# Patient Record
Sex: FEMALE | Race: BLACK OR AFRICAN AMERICAN | NOT HISPANIC OR LATINO | URBAN - METROPOLITAN AREA
[De-identification: names, ages, dates, MRNs, and addresses within clinical notes are randomized per-mention and may not be internally consistent; named-entity substitution may affect disease eponyms.]

---

## 2018-08-15 ENCOUNTER — EMERGENCY (EMERGENCY)
Facility: HOSPITAL | Age: 38
LOS: 0 days | Discharge: HOME | End: 2018-08-15
Attending: EMERGENCY MEDICINE
Payer: COMMERCIAL

## 2018-08-15 VITALS
TEMPERATURE: 97 F | RESPIRATION RATE: 18 BRPM | WEIGHT: 190.04 LBS | DIASTOLIC BLOOD PRESSURE: 89 MMHG | HEART RATE: 71 BPM | SYSTOLIC BLOOD PRESSURE: 143 MMHG | OXYGEN SATURATION: 100 % | HEIGHT: 66 IN
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
## 2018-08-15 DIAGNOSIS — M79.1 MYALGIA: ICD-10-CM
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
## 2018-08-15 DIAGNOSIS — M54.2 CERVICALGIA: ICD-10-CM

## 2018-08-15 DIAGNOSIS — J45.909 UNSPECIFIED ASTHMA, UNCOMPLICATED: ICD-10-CM

## 2018-08-15 DIAGNOSIS — S43.102A UNSPECIFIED DISLOCATION OF LEFT ACROMIOCLAVICULAR JOINT, INITIAL ENCOUNTER: ICD-10-CM

## 2018-08-15 PROCEDURE — 71046 X-RAY EXAM CHEST 2 VIEWS: CPT | Mod: 26

## 2018-08-15 PROCEDURE — 73030 X-RAY EXAM OF SHOULDER: CPT | Mod: 26,LT

## 2018-08-15 PROCEDURE — 99283 EMERGENCY DEPT VISIT LOW MDM: CPT

## 2018-08-15 PROCEDURE — 73070 X-RAY EXAM OF ELBOW: CPT | Mod: 26,LT

## 2018-08-15 RX ORDER — IBUPROFEN 200 MG
600 TABLET ORAL ONCE
Qty: 0 | Refills: 0 | Status: COMPLETED | OUTPATIENT
Start: 2018-08-15 | End: 2018-08-15

## 2018-08-15 RX ORDER — CYCLOBENZAPRINE HYDROCHLORIDE 10 MG/1
10 TABLET, FILM COATED ORAL ONCE
Qty: 0 | Refills: 0 | Status: COMPLETED | OUTPATIENT
Start: 2018-08-15 | End: 2018-08-15

## 2018-08-15 RX ORDER — IBUPROFEN 200 MG
1 TABLET ORAL
Qty: 28 | Refills: 0
Start: 2018-08-15 | End: 2018-08-21

## 2018-08-15 RX ORDER — CYCLOBENZAPRINE HYDROCHLORIDE 10 MG/1
1 TABLET, FILM COATED ORAL
Qty: 15 | Refills: 0
Start: 2018-08-15 | End: 2018-08-19

## 2018-08-15 RX ADMIN — CYCLOBENZAPRINE HYDROCHLORIDE 10 MILLIGRAM(S): 10 TABLET, FILM COATED ORAL at 15:19

## 2018-08-15 RX ADMIN — Medication 600 MILLIGRAM(S): at 15:20
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
## 2018-08-15 NOTE — ED ADULT NURSE NOTE - NSIMPLEMENTINTERV_GEN_ALL_ED
Implemented All Fall with Harm Risk Interventions:  [place] to call system. Call bell, personal items and telephone within reach. Instruct patient to call for assistance. Room bathroom lighting operational. Non-slip footwear when patient is off stretcher. Physically safe environment: no spills, clutter or unnecessary equipment. Stretcher in lowest position, wheels locked, appropriate side rails in place. Provide visual cue, wrist band, yellow gown, etc. Monitor gait and stability. Monitor for mental status changes and reorient to person, place, and time. Review medications for side effects contributing to fall risk. Reinforce activity limits and safety measures with patient and family. Provide visual clues: red socks.

## 2018-08-15 NOTE — ED PROVIDER NOTE - PROGRESS NOTE DETAILS
Pt pain resolved w ibuprofen, and flexeril, pt has ac joint separation, placed in sling, and referred to ortho for f/u.
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
## 2019-06-20 ENCOUNTER — TRANSCRIPTION ENCOUNTER (OUTPATIENT)
Age: 39
End: 2019-06-20

## 2019-06-21 ENCOUNTER — INPATIENT (INPATIENT)
Facility: HOSPITAL | Age: 39
LOS: 0 days | Discharge: HOME | End: 2019-06-21
Attending: OBSTETRICS & GYNECOLOGY | Admitting: OBSTETRICS & GYNECOLOGY
Payer: COMMERCIAL

## 2019-06-21 ENCOUNTER — RESULT REVIEW (OUTPATIENT)
Age: 39
End: 2019-06-21

## 2019-06-21 VITALS
RESPIRATION RATE: 17 BRPM | TEMPERATURE: 98 F | SYSTOLIC BLOOD PRESSURE: 112 MMHG | HEART RATE: 61 BPM | OXYGEN SATURATION: 100 % | DIASTOLIC BLOOD PRESSURE: 60 MMHG

## 2019-06-21 VITALS
TEMPERATURE: 98 F | RESPIRATION RATE: 14 BRPM | DIASTOLIC BLOOD PRESSURE: 79 MMHG | SYSTOLIC BLOOD PRESSURE: 121 MMHG | HEART RATE: 75 BPM | OXYGEN SATURATION: 100 %

## 2019-06-21 DIAGNOSIS — O00.90 UNSPECIFIED ECTOPIC PREGNANCY WITHOUT INTRAUTERINE PREGNANCY: ICD-10-CM

## 2019-06-21 DIAGNOSIS — Z98.891 HISTORY OF UTERINE SCAR FROM PREVIOUS SURGERY: Chronic | ICD-10-CM

## 2019-06-21 PROBLEM — J45.909 UNSPECIFIED ASTHMA, UNCOMPLICATED: Chronic | Status: ACTIVE | Noted: 2018-08-15

## 2019-06-21 LAB
ALBUMIN SERPL ELPH-MCNC: 4.7 G/DL — SIGNIFICANT CHANGE UP (ref 3.3–5)
ALP SERPL-CCNC: 57 U/L — SIGNIFICANT CHANGE UP (ref 40–120)
ALT FLD-CCNC: 21 U/L — SIGNIFICANT CHANGE UP (ref 4–33)
ANION GAP SERPL CALC-SCNC: 11 MMO/L — SIGNIFICANT CHANGE UP (ref 7–14)
APPEARANCE UR: CLEAR — SIGNIFICANT CHANGE UP
APTT BLD: 26.4 SEC — LOW (ref 27.5–36.3)
AST SERPL-CCNC: 13 U/L — SIGNIFICANT CHANGE UP (ref 4–32)
BACTERIA # UR AUTO: NEGATIVE — SIGNIFICANT CHANGE UP
BASE EXCESS BLDV CALC-SCNC: 0.5 MMOL/L — SIGNIFICANT CHANGE UP
BASOPHILS # BLD AUTO: 0.02 K/UL — SIGNIFICANT CHANGE UP (ref 0–0.2)
BASOPHILS NFR BLD AUTO: 0.3 % — SIGNIFICANT CHANGE UP (ref 0–2)
BILIRUB SERPL-MCNC: 0.3 MG/DL — SIGNIFICANT CHANGE UP (ref 0.2–1.2)
BILIRUB UR-MCNC: NEGATIVE — SIGNIFICANT CHANGE UP
BLD GP AB SCN SERPL QL: NEGATIVE — SIGNIFICANT CHANGE UP
BLOOD GAS VENOUS - CREATININE: 0.78 MG/DL — SIGNIFICANT CHANGE UP (ref 0.5–1.3)
BLOOD UR QL VISUAL: HIGH
BUN SERPL-MCNC: 23 MG/DL — SIGNIFICANT CHANGE UP (ref 7–23)
CALCIUM SERPL-MCNC: 9.3 MG/DL — SIGNIFICANT CHANGE UP (ref 8.4–10.5)
CHLORIDE BLDV-SCNC: 105 MMOL/L — SIGNIFICANT CHANGE UP (ref 96–108)
CHLORIDE SERPL-SCNC: 101 MMOL/L — SIGNIFICANT CHANGE UP (ref 98–107)
CO2 SERPL-SCNC: 25 MMOL/L — SIGNIFICANT CHANGE UP (ref 22–31)
COLOR SPEC: YELLOW — SIGNIFICANT CHANGE UP
CREAT SERPL-MCNC: 0.79 MG/DL — SIGNIFICANT CHANGE UP (ref 0.5–1.3)
EOSINOPHIL # BLD AUTO: 0.01 K/UL — SIGNIFICANT CHANGE UP (ref 0–0.5)
EOSINOPHIL NFR BLD AUTO: 0.1 % — SIGNIFICANT CHANGE UP (ref 0–6)
GAS PNL BLDV: 136 MMOL/L — SIGNIFICANT CHANGE UP (ref 136–146)
GLUCOSE BLDV-MCNC: 102 MG/DL — HIGH (ref 70–99)
GLUCOSE SERPL-MCNC: 110 MG/DL — HIGH (ref 70–99)
GLUCOSE UR-MCNC: NEGATIVE — SIGNIFICANT CHANGE UP
HCG SERPL-ACNC: 537.8 MIU/ML — SIGNIFICANT CHANGE UP
HCO3 BLDV-SCNC: 23 MMOL/L — SIGNIFICANT CHANGE UP (ref 20–27)
HCT VFR BLD CALC: 35 % — SIGNIFICANT CHANGE UP (ref 34.5–45)
HCT VFR BLDV CALC: 35.1 % — SIGNIFICANT CHANGE UP (ref 34.5–45)
HGB BLD-MCNC: 10.9 G/DL — LOW (ref 11.5–15.5)
HGB BLDV-MCNC: 11.4 G/DL — LOW (ref 11.5–15.5)
HYALINE CASTS # UR AUTO: NEGATIVE — SIGNIFICANT CHANGE UP
IMM GRANULOCYTES NFR BLD AUTO: 0.3 % — SIGNIFICANT CHANGE UP (ref 0–1.5)
INR BLD: 1.1 — SIGNIFICANT CHANGE UP (ref 0.88–1.17)
KETONES UR-MCNC: SIGNIFICANT CHANGE UP
LACTATE BLDV-MCNC: 0.7 MMOL/L — SIGNIFICANT CHANGE UP (ref 0.5–2)
LEUKOCYTE ESTERASE UR-ACNC: NEGATIVE — SIGNIFICANT CHANGE UP
LIDOCAIN IGE QN: 19.2 U/L — SIGNIFICANT CHANGE UP (ref 7–60)
LYMPHOCYTES # BLD AUTO: 1.01 K/UL — SIGNIFICANT CHANGE UP (ref 1–3.3)
LYMPHOCYTES # BLD AUTO: 14.5 % — SIGNIFICANT CHANGE UP (ref 13–44)
MCHC RBC-ENTMCNC: 26.5 PG — LOW (ref 27–34)
MCHC RBC-ENTMCNC: 31.1 % — LOW (ref 32–36)
MCV RBC AUTO: 85.2 FL — SIGNIFICANT CHANGE UP (ref 80–100)
MONOCYTES # BLD AUTO: 0.29 K/UL — SIGNIFICANT CHANGE UP (ref 0–0.9)
MONOCYTES NFR BLD AUTO: 4.2 % — SIGNIFICANT CHANGE UP (ref 2–14)
NEUTROPHILS # BLD AUTO: 5.62 K/UL — SIGNIFICANT CHANGE UP (ref 1.8–7.4)
NEUTROPHILS NFR BLD AUTO: 80.6 % — HIGH (ref 43–77)
NITRITE UR-MCNC: NEGATIVE — SIGNIFICANT CHANGE UP
NRBC # FLD: 0 K/UL — SIGNIFICANT CHANGE UP (ref 0–0)
PCO2 BLDV: 48 MMHG — SIGNIFICANT CHANGE UP (ref 41–51)
PH BLDV: 7.34 PH — SIGNIFICANT CHANGE UP (ref 7.32–7.43)
PH UR: 7 — SIGNIFICANT CHANGE UP (ref 5–8)
PLATELET # BLD AUTO: 176 K/UL — SIGNIFICANT CHANGE UP (ref 150–400)
PMV BLD: 10.7 FL — SIGNIFICANT CHANGE UP (ref 7–13)
PO2 BLDV: 23 MMHG — LOW (ref 35–40)
POTASSIUM BLDV-SCNC: 4.1 MMOL/L — SIGNIFICANT CHANGE UP (ref 3.4–4.5)
POTASSIUM SERPL-MCNC: 4.2 MMOL/L — SIGNIFICANT CHANGE UP (ref 3.5–5.3)
POTASSIUM SERPL-SCNC: 4.2 MMOL/L — SIGNIFICANT CHANGE UP (ref 3.5–5.3)
PROT SERPL-MCNC: 7.5 G/DL — SIGNIFICANT CHANGE UP (ref 6–8.3)
PROT UR-MCNC: 30 — SIGNIFICANT CHANGE UP
PROTHROM AB SERPL-ACNC: 12.3 SEC — SIGNIFICANT CHANGE UP (ref 9.8–13.1)
RBC # BLD: 4.11 M/UL — SIGNIFICANT CHANGE UP (ref 3.8–5.2)
RBC # FLD: 12.9 % — SIGNIFICANT CHANGE UP (ref 10.3–14.5)
RBC CASTS # UR COMP ASSIST: SIGNIFICANT CHANGE UP (ref 0–?)
RH IG SCN BLD-IMP: POSITIVE — SIGNIFICANT CHANGE UP
RH IG SCN BLD-IMP: POSITIVE — SIGNIFICANT CHANGE UP
SAO2 % BLDV: 28.3 % — LOW (ref 60–85)
SODIUM SERPL-SCNC: 137 MMOL/L — SIGNIFICANT CHANGE UP (ref 135–145)
SP GR SPEC: 1.04 — SIGNIFICANT CHANGE UP (ref 1–1.04)
SQUAMOUS # UR AUTO: SIGNIFICANT CHANGE UP
UROBILINOGEN FLD QL: NORMAL — SIGNIFICANT CHANGE UP
WBC # BLD: 6.97 K/UL — SIGNIFICANT CHANGE UP (ref 3.8–10.5)
WBC # FLD AUTO: 6.97 K/UL — SIGNIFICANT CHANGE UP (ref 3.8–10.5)
WBC UR QL: SIGNIFICANT CHANGE UP (ref 0–?)

## 2019-06-21 PROCEDURE — 59151 TREAT ECTOPIC PREGNANCY: CPT

## 2019-06-21 PROCEDURE — 88305 TISSUE EXAM BY PATHOLOGIST: CPT | Mod: 26

## 2019-06-21 PROCEDURE — 76830 TRANSVAGINAL US NON-OB: CPT | Mod: 26

## 2019-06-21 RX ORDER — ONDANSETRON 8 MG/1
4 TABLET, FILM COATED ORAL ONCE
Refills: 0 | Status: COMPLETED | OUTPATIENT
Start: 2019-06-21 | End: 2019-06-21

## 2019-06-21 RX ORDER — ACETAMINOPHEN 500 MG
975 TABLET ORAL EVERY 6 HOURS
Refills: 0 | Status: DISCONTINUED | OUTPATIENT
Start: 2019-06-21 | End: 2019-06-21

## 2019-06-21 RX ORDER — IBUPROFEN 200 MG
600 TABLET ORAL EVERY 6 HOURS
Refills: 0 | Status: DISCONTINUED | OUTPATIENT
Start: 2019-06-21 | End: 2019-06-21

## 2019-06-21 RX ORDER — IBUPROFEN 200 MG
1 TABLET ORAL
Qty: 0 | Refills: 0 | DISCHARGE
Start: 2019-06-21

## 2019-06-21 RX ORDER — OXYCODONE HYDROCHLORIDE 5 MG/1
1 TABLET ORAL
Qty: 8 | Refills: 0
Start: 2019-06-21 | End: 2019-06-22

## 2019-06-21 RX ORDER — ONDANSETRON 8 MG/1
4 TABLET, FILM COATED ORAL ONCE
Refills: 0 | Status: DISCONTINUED | OUTPATIENT
Start: 2019-06-21 | End: 2019-06-21

## 2019-06-21 RX ORDER — SODIUM CHLORIDE 9 MG/ML
1000 INJECTION INTRAMUSCULAR; INTRAVENOUS; SUBCUTANEOUS ONCE
Refills: 0 | Status: COMPLETED | OUTPATIENT
Start: 2019-06-21 | End: 2019-06-21

## 2019-06-21 RX ORDER — HYDROMORPHONE HYDROCHLORIDE 2 MG/ML
0.5 INJECTION INTRAMUSCULAR; INTRAVENOUS; SUBCUTANEOUS
Refills: 0 | Status: DISCONTINUED | OUTPATIENT
Start: 2019-06-21 | End: 2019-06-21

## 2019-06-21 RX ORDER — OXYCODONE HYDROCHLORIDE 5 MG/1
5 TABLET ORAL ONCE
Refills: 0 | Status: DISCONTINUED | OUTPATIENT
Start: 2019-06-21 | End: 2019-06-21

## 2019-06-21 RX ORDER — MORPHINE SULFATE 50 MG/1
4 CAPSULE, EXTENDED RELEASE ORAL ONCE
Refills: 0 | Status: DISCONTINUED | OUTPATIENT
Start: 2019-06-21 | End: 2019-06-21

## 2019-06-21 RX ORDER — HYDROMORPHONE HYDROCHLORIDE 2 MG/ML
1 INJECTION INTRAMUSCULAR; INTRAVENOUS; SUBCUTANEOUS
Refills: 0 | Status: DISCONTINUED | OUTPATIENT
Start: 2019-06-21 | End: 2019-06-21

## 2019-06-21 RX ORDER — ACETAMINOPHEN 500 MG
3 TABLET ORAL
Qty: 0 | Refills: 0 | DISCHARGE
Start: 2019-06-21

## 2019-06-21 RX ADMIN — OXYCODONE HYDROCHLORIDE 5 MILLIGRAM(S): 5 TABLET ORAL at 13:55

## 2019-06-21 RX ADMIN — MORPHINE SULFATE 4 MILLIGRAM(S): 50 CAPSULE, EXTENDED RELEASE ORAL at 07:16

## 2019-06-21 RX ADMIN — SODIUM CHLORIDE 1000 MILLILITER(S): 9 INJECTION INTRAMUSCULAR; INTRAVENOUS; SUBCUTANEOUS at 07:18

## 2019-06-21 RX ADMIN — ONDANSETRON 4 MILLIGRAM(S): 8 TABLET, FILM COATED ORAL at 06:58

## 2019-06-21 RX ADMIN — MORPHINE SULFATE 4 MILLIGRAM(S): 50 CAPSULE, EXTENDED RELEASE ORAL at 06:58

## 2019-06-21 NOTE — ED PROVIDER NOTE - OBJECTIVE STATEMENT
37 y/o F  (previous , miscarriage) here with left pelvic pain.  Pt reports sudden onset nonradiating left pelvic pain since 1am.  Associated with nausea, nbnb vomiting and vaginal bleeding.  No fever, chills, diarrhea.  LMP 4 days ago, regular, resolved but bleeding started again today.    Of note, UCG in ER positive.

## 2019-06-21 NOTE — ED ADULT NURSE NOTE - OBJECTIVE STATEMENT
Pt rcvd to 19 c/o LLQ abdominal/L Pelvic sharp severe pain onset suddenly at 1 am, w/o waxing/waning to pain level or location, also reports since onset of pain having vaginal bleeding x1 pad. Took motrin at 130 am w/o relief,  Rpts x1 episode nbnb emesis around 4am then came to ED.  States had normal menstrual cycle ended 3 days ago and this is abnormal for her to have bleeding again.  Pt reports urinary pressure, but no dysuria, urgency or frequency, denies hx similar prior symptoms. +UCG noted, ED MD Castillo made aware immediately and did prelim Abd sono at bedside.  IV Placed to R AC #20g, labs drawn/sent, preops & ua/culture sent as ordered. PMHx Asthma,  previously x 1 miscarriage, 1 C-sect delivery.   Pt medicated for pain as ordered, ED MD Castillo notified Charge RN to prioritize pt transport to TVUS.  Vitally stable, aaox4, uncomfortable appearing.

## 2019-06-21 NOTE — ASU DISCHARGE PLAN (ADULT/PEDIATRIC) - CALL YOUR DOCTOR IF YOU HAVE ANY OF THE FOLLOWING:
Inability to tolerate liquids or foods/Nausea and vomiting that does not stop/Bleeding that does not stop/Wound/Surgical Site with redness, or foul smelling discharge or pus/Unable to urinate/Fever greater than (need to indicate Fahrenheit or Celsius)

## 2019-06-21 NOTE — ED ADULT NURSE NOTE - CHIEF COMPLAINT QUOTE
Pt arrives to ED c/o of L sided abdominal pain since 1 am with 1 episode of emesis and 2 soft stools. Denies medical hx. Pt appears uncomfortable but not in distress. VSS.    0610 Pt reporting SOB. EKG in progress.

## 2019-06-21 NOTE — BRIEF OPERATIVE NOTE - NSICDXBRIEFPOSTOP_GEN_ALL_CORE_FT
POST-OP DIAGNOSIS:  Ruptured left tubal ectopic pregnancy causing hemoperitoneum 21-Jun-2019 12:57:06  Diamante Gaytan

## 2019-06-21 NOTE — BRIEF OPERATIVE NOTE - NSICDXBRIEFPROCEDURE_GEN_ALL_CORE_FT
PROCEDURES:  Diagnostic laparoscopy 21-Jun-2019 12:56:42  Diamante Gaytan  Salpingectomy, left 21-Jun-2019 12:56:26  Diamante Gaytan

## 2019-06-21 NOTE — ED ADULT TRIAGE NOTE - CHIEF COMPLAINT QUOTE
Pt arrives to ED c/o of L sided abdominal pain since 1 am with 1 episode of emesis and 2 soft stools. Denies medical hx. Pt appears uncomfortable but not in distress. VSS. Pt arrives to ED c/o of L sided abdominal pain since 1 am with 1 episode of emesis and 2 soft stools. Denies medical hx. Pt appears uncomfortable but not in distress. VSS.    0610 Pt reporting SOB. EKG in progress.

## 2019-06-21 NOTE — CONSULT NOTE ADULT - PROBLEM SELECTOR RECOMMENDATION 9
Continue pain Rx PRN  For add on OR for diagnostic laparoscopy, possible salpingectomy, and possible exploratory laparotomy    Diamante Gaytan, PGY1  D/W Dr. Jones

## 2019-06-21 NOTE — CONSULT NOTE ADULT - SUBJECTIVE AND OBJECTIVE BOX
HPI    38y    Last Menstrual Period 1 week prior presents with L sided pelvic pain and vaginal spotting, GYN consulted for ruptured ectopic s/p positive TVUS. Patient states her LMP was 1 week prior, then 1 month prior to that. States this morning at roughly 1 am she felt a L sided stabbing pain, 10/10. Took ibuprofen, did not help. Then at around 4a she noted some vaginal spotting when she wiped. Also reports associated nausea with 1x episode of emesis at this time. s/p 1x 4mg IV morphine in ED for pain. Last ate 9p.    OB/GYN HISTORY:     -  pLTCS, NRFHT  2018 MAB s/p D&C    PAST MEDICAL & SURGICAL HISTORY:  H/O: , D&C, asthma    Allergies:  No Known Allergies    MEDICATIONS:  albuterol inhaler PRN (rarely uses per patient)    SOCIAL HISTORY: Denies etOH, Tobacco, or illicit drug use    REVIEW OF SYSTEMS:    CONSTITUTIONAL: No weakness, fevers or chills  EYES/ENT: No visual changes;  No vertigo or throat pain   NECK: No pain or stiffness  RESPIRATORY: No cough, wheezing, hemoptysis; No shortness of breath  CARDIOVASCULAR: No chest pain or palpitations  GASTROINTESTINAL: +nausea and vomiting. No abdominal or epigastric pain. No diarrhea or constipation. No melena or hematochezia.  GENITOURINARY: No dysuria, frequency or hematuria, see HPI  NEUROLOGICAL: No numbness or weakness  SKIN: No itching, burning, rashes, or lesions   All other review of systems is negative unless indicated above.    Name of GYN Physician: patient unsure, cannot remember name    Vital Signs Last 24 Hrs  T(C): 36.7 (2019 07:04), Max: 36.7 (2019 05:26)  T(F): 98.1 (2019 07:04), Max: 98.1 (2019 07:04)  HR: 80 (2019 07:04) (75 - 80)  BP: 129/76 (2019 07:04) (121/79 - 129/76)  BP(mean): --  RR: 18 (2019 07:04) (14 - 18)  SpO2: 100% (2019 07:04) (100% - 100%)    PHYSICAL EXAM:  Constitutional: alert and oriented x 3  Respiratory: clear  Cardiovascular: regular rate and rhythm  Abdominal: Soft, TTP, particularly in the LLQ, voluntary guarding to deep palpation in LLQ. No rebound.   Genitourinary: NEFG  SSE: small amount of blood at cervical os, no pooling of blood in the vaginal vault  Pelvic: No CMT, No palpable adnexal masses, TTP in L adnexa, Fundus NTTP         LABS:                        10.9   6.97  )-----------( 176      ( 2019 06:45 )             35.0     06-    137  |  101  |  23  ----------------------------<  110<H>  4.2   |  25  |  0.79    Ca    9.3      2019 06:45    TPro  7.5  /  Alb  4.7  /  TBili  0.3  /  DBili  x   /  AST  13  /  ALT  21  /  AlkPhos  57  06-21    PT/INR - ( 2019 06:03 )   PT: 12.3 SEC;   INR: 1.10          PTT - ( 2019 06:03 )  PTT:26.4 SEC  Urinalysis Basic - ( 2019 06:45 )    Serum Beta-.8    Color: YELLOW / Appearance: CLEAR / S.039 / pH: 7.0  Gluc: NEGATIVE / Ketone: SMALL  / Bili: NEGATIVE / Urobili: NORMAL   Blood: MODERATE / Protein: 30 / Nitrite: NEGATIVE   Leuk Esterase: NEGATIVE / RBC: 3-5 / WBC 0-2   Sq Epi: FEW / Non Sq Epi: x / Bacteria: NEGATIVE        Blood Type: O Positive      RADIOLOGY & ADDITIONAL STUDIES:    TVUS- pelvic free fluid, awaiting final read

## 2019-06-21 NOTE — CONSULT NOTE ADULT - ASSESSMENT
8y  Last Menstrual Period 1 week prior presents with L sided pelvic pain and vaginal spotting, GYN consulted for ruptured ectopic s/p positive TVUS. VSS and wnl. PE significant for LLQ TTP and voluntary guarding. Patient currently stable.

## 2019-06-21 NOTE — ED PROVIDER NOTE - PROGRESS NOTE DETAILS
Jonathan JOHNS: HD stable, bedside FAST negative. Honey: Gyn resident was at bedside. Will speak to attending. Discussed with ultrasound tech. Likely ruptured ectopic. +fluid in Hu's pouch.

## 2019-06-21 NOTE — ED PROVIDER NOTE - CLINICAL SUMMARY MEDICAL DECISION MAKING FREE TEXT BOX
37 y/o F with sudden onset L pelvic pain and vag bleeding, pos ucg in ER.  HD stable, but concern for rupture ectopic.  Labs, tvus, pain meds, ob.

## 2019-06-21 NOTE — BRIEF OPERATIVE NOTE - NSICDXBRIEFPREOP_GEN_ALL_CORE_FT
PRE-OP DIAGNOSIS:  Ruptured left tubal ectopic pregnancy causing hemoperitoneum 21-Jun-2019 12:56:55  Diamante Gaytan

## 2019-06-21 NOTE — ED ADULT NURSE NOTE - NSIMPLEMENTINTERV_GEN_ALL_ED
Implemented All Universal Safety Interventions:  [place] to call system. Call bell, personal items and telephone within reach. Instruct patient to call for assistance. Room bathroom lighting operational. Non-slip footwear when patient is off stretcher. Physically safe environment: no spills, clutter or unnecessary equipment. Stretcher in lowest position, wheels locked, appropriate side rails in place.

## 2019-06-23 LAB
BACTERIA UR CULT: SIGNIFICANT CHANGE UP
SPECIMEN SOURCE: SIGNIFICANT CHANGE UP

## 2019-06-26 NOTE — CHART NOTE - NSCHARTNOTEFT_GEN_A_CORE
R4 OB Update    reviewed pathology from recent ectopic pregnancy.  Benign fallopian tube w/ ectopic.     Attempted to call patient on 6/25 and 6/26 w/ result.  Only one number left in chart for patient.   No answer on 3 different attempts.  left message for patient to call back.  No call back has been received as of yet.   No post-op check has been scheduled in our clinic either as of yet despite instructions to patient to do so.   Will send certified letter to patients address on file to inform her of benign pathology results and explain importance of follow up for post-op check.  Will then remove patient from GYN follow up.     Mere Medina PGY-4  d/w Dr. Prado

## 2021-01-01 NOTE — ED PROVIDER NOTE - CONSTITUTIONAL NOURISHMENT, MLM
Problem: Respiratory Impairment - Respiratory Therapy 253  Intervention: Respiratory support devices  Note: Intervention Status  Done  Intervention: Inhaled medication delivery  Note: Intervention Status  Done      well

## 2021-11-04 ENCOUNTER — OUTPATIENT (OUTPATIENT)
Dept: OUTPATIENT SERVICES | Facility: HOSPITAL | Age: 41
LOS: 1 days | End: 2021-11-04
Payer: COMMERCIAL

## 2021-11-04 VITALS
HEART RATE: 82 BPM | DIASTOLIC BLOOD PRESSURE: 63 MMHG | RESPIRATION RATE: 17 BRPM | SYSTOLIC BLOOD PRESSURE: 110 MMHG | TEMPERATURE: 99 F

## 2021-11-04 VITALS — TEMPERATURE: 98 F

## 2021-11-04 DIAGNOSIS — Z3A.00 WEEKS OF GESTATION OF PREGNANCY NOT SPECIFIED: ICD-10-CM

## 2021-11-04 DIAGNOSIS — O26.899 OTHER SPECIFIED PREGNANCY RELATED CONDITIONS, UNSPECIFIED TRIMESTER: ICD-10-CM

## 2021-11-04 DIAGNOSIS — Z98.891 HISTORY OF UTERINE SCAR FROM PREVIOUS SURGERY: Chronic | ICD-10-CM

## 2021-11-04 PROCEDURE — 59025 FETAL NON-STRESS TEST: CPT

## 2021-11-04 PROCEDURE — G0463: CPT

## 2021-11-04 NOTE — OB PROVIDER TRIAGE NOTE - NSHPPHYSICALEXAM_GEN_ALL_CORE
Gen: NAD  CV: RRR  Pulm: breathing comfortably on RA  Abd: gravid, nontender  Back: No CVA tenderness  Extr: moving all extremities with ease  – VE: 1.5/30/-3  – Reactive NST: baseline 120, mod variability, +accels, -decels  – [place]: absent  - Sono: vtx, fundal placenta, BPP 8/8, ROGER 5.1,  MVP 4.7

## 2021-11-04 NOTE — OB PROVIDER TRIAGE NOTE - NSHPLABSRESULTS_GEN_ALL_CORE
T(C): 37.2 (11-04-21 @ 15:54), Max: 37.2 (11-04-21 @ 15:54)  HR: 73 (11-04-21 @ 17:39) (66 - 107)  BP: 99/64 (11-04-21 @ 17:39) (99/64 - 120/67)  RR: 17 (11-04-21 @ 15:54) (17 - 20)  SpO2: 100% (11-04-21 @ 17:26) (94% - 100%)

## 2021-11-04 NOTE — OB PROVIDER TRIAGE NOTE - HISTORY OF PRESENT ILLNESS
HPI: 42yo F  @38+4 comes in with back pain. Patient sees an outside OBGYN practice in [employer], cannot remember +FM. -LOF. -CTXs. -VB. Pt denies any other concerns.    PNC: Denies prenatal issues. GBS .  EFW g by mel.    OBHx:   GynHx: denies hx STIs, fibroids, polyps, cysts  PMH: denies hx clotting or bleeding disorders, HTN, DM  PSH: denies  PFH: no hx congential disorders, bleeding/clotting disorders  Psych: denies   Social: denies etoh, smoking, drugs. Safe at home/in relationship.  Meds: PNV   Allergies: NKDA  Will accept blood transfusions? Yes      Gen: NAD  CV: RRR  Pulm: breathing comfortably on RA  Abd: gravid, nontender  Extr: moving all extremities with ease  – FS:   – Spec: pooling, nitrazine, ferning, bleeding,  (lesions if patient with HSV2 history)  – VE: //  – FHT Cat I: baseline 1, mod variability, +accels, -decels  – [place]: qmin  – Sono: vertex      No prenatal issues. GBS _.    Plan  - Admit to LND. Routine Labs. IVF.  - Expectant management/IOL w/  - Fetus: cat 1 tracing. VTX. EFW g by mel. Continuous EFM. No concerns.  - Prenatal issues: none  - GBS unknown  - Pain: epidural PRN    Patient discussed with attending physician, Dr. Monroy     HPI: 42yo F  @38+3 comes in with back pain. She has not taken anything for the pain. Patient sees an outside OBGYN practice in [place], cannot remember name of practitioner but was planning on delivering at [facility]. She presented to this hospital because she was in the area for a convention. Patient desires TOLAC. Patient states that she was advised to be induced last Friday for oligohydramnios of 4.5. Patient declined because she wanted to go into spontaneous labor. Patient currently does not have a  or induction planned. +FM. -LOF. -CTXs. -VB. Pt denies any other concerns.     PNC: Patient desires TOLAC. oligohydramnios, GBS unknown.  OBHx: 2013 pLTCS for NRFHT             miscarriage 1st trimester            2018 ectopic s/p L salpingectomy  GynHx: denies hx STIs, fibroids, polyps, cysts  PMH: Asthma, has not used inhaler during pregnancy. Hospitalized as a child for asthma but denies intubation or history of steroid taper. Denies hx clotting or bleeding disorders, HTN, DM  PSH: L salpingectomy  PFH: no hx congential disorders, bleeding/clotting disorders  Psych: denies   Social: denies etoh, smoking, drugs. Safe at home/in relationship.  Meds: PNV   Allergies: NKDA  Will accept blood transfusions? Yes

## 2021-11-04 NOTE — OB PROVIDER TRIAGE NOTE - NSOBPROVIDERNOTE_OBGYN_ALL_OB_FT
40 yo  @38+3 here for back pain, denies contractions. Reactive NST, BPP 10/10 ROGER 5.1 with MVP 4.7. Patient is 1.5//-3 and comfortable. Patient is not in labor. She can be safely discharged home with close follow up as soon as possible with her primary OBGYN. Return precautions reviewed with patient including regular q5 min contractions, LOF, VB, or decreased FM.      Patient discussed with attending physician, Dr. Elizalde.    RAISA Ennis, PGY1

## 2021-11-07 ENCOUNTER — INPATIENT (INPATIENT)
Facility: HOSPITAL | Age: 41
LOS: 1 days | Discharge: HOME | End: 2021-11-09
Attending: OBSTETRICS & GYNECOLOGY | Admitting: OBSTETRICS & GYNECOLOGY
Payer: COMMERCIAL

## 2021-11-07 VITALS — DIASTOLIC BLOOD PRESSURE: 65 MMHG | HEART RATE: 89 BPM | SYSTOLIC BLOOD PRESSURE: 121 MMHG

## 2021-11-07 DIAGNOSIS — Z98.891 HISTORY OF UTERINE SCAR FROM PREVIOUS SURGERY: Chronic | ICD-10-CM

## 2021-11-07 DIAGNOSIS — Z3A.00 WEEKS OF GESTATION OF PREGNANCY NOT SPECIFIED: ICD-10-CM

## 2021-11-07 DIAGNOSIS — O26.899 OTHER SPECIFIED PREGNANCY RELATED CONDITIONS, UNSPECIFIED TRIMESTER: ICD-10-CM

## 2021-11-07 DIAGNOSIS — Z34.80 ENCOUNTER FOR SUPERVISION OF OTHER NORMAL PREGNANCY, UNSPECIFIED TRIMESTER: ICD-10-CM

## 2021-11-07 PROBLEM — J45.909 UNSPECIFIED ASTHMA, UNCOMPLICATED: Chronic | Status: ACTIVE | Noted: 2021-11-04

## 2021-11-07 LAB
HBV SURFACE AG SERPL QL IA: SIGNIFICANT CHANGE UP
HIV 1 & 2 AB SERPL IA.RAPID: SIGNIFICANT CHANGE UP
SARS-COV-2 RNA SPEC QL NAA+PROBE: SIGNIFICANT CHANGE UP

## 2021-11-07 RX ORDER — CITRIC ACID/SODIUM CITRATE 300-500 MG
15 SOLUTION, ORAL ORAL EVERY 6 HOURS
Refills: 0 | Status: DISCONTINUED | OUTPATIENT
Start: 2021-11-07 | End: 2021-11-07

## 2021-11-07 RX ORDER — OXYTOCIN 10 UNIT/ML
333.33 VIAL (ML) INJECTION
Qty: 20 | Refills: 0 | Status: DISCONTINUED | OUTPATIENT
Start: 2021-11-07 | End: 2021-11-09

## 2021-11-07 RX ORDER — SODIUM CHLORIDE 9 MG/ML
1000 INJECTION, SOLUTION INTRAVENOUS
Refills: 0 | Status: DISCONTINUED | OUTPATIENT
Start: 2021-11-07 | End: 2021-11-07

## 2021-11-07 RX ORDER — DIPHENHYDRAMINE HCL 50 MG
25 CAPSULE ORAL EVERY 6 HOURS
Refills: 0 | Status: DISCONTINUED | OUTPATIENT
Start: 2021-11-07 | End: 2021-11-09

## 2021-11-07 RX ORDER — ACETAMINOPHEN 500 MG
975 TABLET ORAL
Refills: 0 | Status: DISCONTINUED | OUTPATIENT
Start: 2021-11-07 | End: 2021-11-09

## 2021-11-07 RX ORDER — OXYCODONE HYDROCHLORIDE 5 MG/1
5 TABLET ORAL ONCE
Refills: 0 | Status: DISCONTINUED | OUTPATIENT
Start: 2021-11-07 | End: 2021-11-09

## 2021-11-07 RX ORDER — IBUPROFEN 200 MG
600 TABLET ORAL EVERY 6 HOURS
Refills: 0 | Status: COMPLETED | OUTPATIENT
Start: 2021-11-07 | End: 2022-10-06

## 2021-11-07 RX ORDER — TETANUS TOXOID, REDUCED DIPHTHERIA TOXOID AND ACELLULAR PERTUSSIS VACCINE, ADSORBED 5; 2.5; 8; 8; 2.5 [IU]/.5ML; [IU]/.5ML; UG/.5ML; UG/.5ML; UG/.5ML
0.5 SUSPENSION INTRAMUSCULAR ONCE
Refills: 0 | Status: DISCONTINUED | OUTPATIENT
Start: 2021-11-07 | End: 2021-11-09

## 2021-11-07 RX ORDER — SIMETHICONE 80 MG/1
80 TABLET, CHEWABLE ORAL EVERY 4 HOURS
Refills: 0 | Status: DISCONTINUED | OUTPATIENT
Start: 2021-11-07 | End: 2021-11-09

## 2021-11-07 RX ORDER — PRAMOXINE HYDROCHLORIDE 150 MG/15G
1 AEROSOL, FOAM RECTAL EVERY 4 HOURS
Refills: 0 | Status: DISCONTINUED | OUTPATIENT
Start: 2021-11-07 | End: 2021-11-09

## 2021-11-07 RX ORDER — SODIUM CHLORIDE 9 MG/ML
3 INJECTION INTRAMUSCULAR; INTRAVENOUS; SUBCUTANEOUS EVERY 8 HOURS
Refills: 0 | Status: DISCONTINUED | OUTPATIENT
Start: 2021-11-07 | End: 2021-11-09

## 2021-11-07 RX ORDER — IBUPROFEN 200 MG
600 TABLET ORAL EVERY 6 HOURS
Refills: 0 | Status: DISCONTINUED | OUTPATIENT
Start: 2021-11-07 | End: 2021-11-09

## 2021-11-07 RX ORDER — KETOROLAC TROMETHAMINE 30 MG/ML
30 SYRINGE (ML) INJECTION ONCE
Refills: 0 | Status: DISCONTINUED | OUTPATIENT
Start: 2021-11-07 | End: 2021-11-07

## 2021-11-07 RX ORDER — OXYTOCIN 10 UNIT/ML
10 VIAL (ML) INJECTION ONCE
Refills: 0 | Status: COMPLETED | OUTPATIENT
Start: 2021-11-07 | End: 2021-11-07

## 2021-11-07 RX ORDER — LANOLIN
1 OINTMENT (GRAM) TOPICAL EVERY 6 HOURS
Refills: 0 | Status: DISCONTINUED | OUTPATIENT
Start: 2021-11-07 | End: 2021-11-09

## 2021-11-07 RX ORDER — HYDROCORTISONE 1 %
1 OINTMENT (GRAM) TOPICAL EVERY 6 HOURS
Refills: 0 | Status: DISCONTINUED | OUTPATIENT
Start: 2021-11-07 | End: 2021-11-09

## 2021-11-07 RX ORDER — AER TRAVELER 0.5 G/1
1 SOLUTION RECTAL; TOPICAL EVERY 4 HOURS
Refills: 0 | Status: DISCONTINUED | OUTPATIENT
Start: 2021-11-07 | End: 2021-11-09

## 2021-11-07 RX ORDER — DIBUCAINE 1 %
1 OINTMENT (GRAM) RECTAL EVERY 6 HOURS
Refills: 0 | Status: DISCONTINUED | OUTPATIENT
Start: 2021-11-07 | End: 2021-11-09

## 2021-11-07 RX ORDER — OXYTOCIN 10 UNIT/ML
333.33 VIAL (ML) INJECTION
Qty: 20 | Refills: 0 | Status: DISCONTINUED | OUTPATIENT
Start: 2021-11-07 | End: 2021-11-07

## 2021-11-07 RX ORDER — BENZOCAINE 10 %
1 GEL (GRAM) MUCOUS MEMBRANE EVERY 6 HOURS
Refills: 0 | Status: DISCONTINUED | OUTPATIENT
Start: 2021-11-07 | End: 2021-11-09

## 2021-11-07 RX ORDER — MAGNESIUM HYDROXIDE 400 MG/1
30 TABLET, CHEWABLE ORAL
Refills: 0 | Status: DISCONTINUED | OUTPATIENT
Start: 2021-11-07 | End: 2021-11-09

## 2021-11-07 RX ORDER — OXYCODONE HYDROCHLORIDE 5 MG/1
5 TABLET ORAL
Refills: 0 | Status: DISCONTINUED | OUTPATIENT
Start: 2021-11-07 | End: 2021-11-09

## 2021-11-07 RX ADMIN — Medication 0.2 MILLIGRAM(S): at 11:30

## 2021-11-07 RX ADMIN — Medication 10 UNIT(S): at 11:29

## 2021-11-07 RX ADMIN — Medication 975 MILLIGRAM(S): at 19:35

## 2021-11-07 RX ADMIN — SODIUM CHLORIDE 125 MILLILITER(S): 9 INJECTION, SOLUTION INTRAVENOUS at 05:18

## 2021-11-07 RX ADMIN — SODIUM CHLORIDE 3 MILLILITER(S): 9 INJECTION INTRAMUSCULAR; INTRAVENOUS; SUBCUTANEOUS at 22:57

## 2021-11-07 RX ADMIN — Medication 30 MILLIGRAM(S): at 12:37

## 2021-11-07 NOTE — OB PROVIDER H&P - HISTORY OF PRESENT ILLNESS
HPI: Pt is a 41y   presenting for ROR. Pt states that she started to feel cxt every 10min around 10p and then felt a gush of fluid around 1230p.   Of note, pt presented to [facility] on  with the complaint of back pain and found to not be and labor. Patient sees Dr. Du of [place] and was planning on delivering at [facility] but came here due to being in the area for the  of her sister(pt stated on  that she was in the area for a convention). Patient desires TOLAC. Patient states that she was advised to be induced last Friday for oligohydramnios of 4.5. Patient declined because she wanted to go into spontaneous labor. +FM.      PNC: Patient desires TOLAC. oligohydramnios, GBS neg.  OBHx: 2013 pLTCS for NRFHT             miscarriage 1st trimester            2018 ectopic s/p L salpingectomy  GynHx: denies hx STIs, fibroids, polyps, cysts  PMH: Asthma, has not used inhaler during pregnancy. Hospitalized as a child for asthma but denies intubation or history of steroid taper. Denies hx clotting or bleeding disorders, HTN, DM  PSH: L salpingectomy  PFH: no hx congential disorders, bleeding/clotting disorders  Psych: denies   Social: denies etoh, smoking, drugs. Safe at home/in relationship.  Meds: PNV   Allergies: NKDA  Will accept blood transfusions? Yes               HPI: Pt is a 41y   presenting for ROR. Pt states that she started to feel cxt every 10min around 10p and then felt a gush of fluid around 1130p.   Of note, pt presented to [facility] on  with the complaint of back pain and found to not be and labor. Patient sees Dr. Du of [place] and was planning on delivering at [facility] but came here due to being in the area for the  of her sister(pt stated on  that she was in the area for a convention). Patient desires TOLAC. Patient states that she was advised to be induced last Friday for oligohydramnios of 4.5. Patient declined because she wanted to go into spontaneous labor. +FM.      PNC: Patient desires TOLAC. oligohydramnios, GBS neg.  OBHx: 2013 pLTCS for NRFHT             miscarriage 1st trimester            2018 ectopic s/p L salpingectomy  GynHx: denies hx STIs, fibroids, polyps, cysts  PMH: Asthma, has not used inhaler during pregnancy. Hospitalized as a child for asthma but denies intubation or history of steroid taper. Denies hx clotting or bleeding disorders, HTN, DM  PSH: L salpingectomy  PFH: no hx congential disorders, bleeding/clotting disorders  Psych: denies   Social: denies etoh, smoking, drugs. Safe at home/in relationship.  Meds: PNV   Allergies: NKDA  Will accept blood transfusions? Yes

## 2021-11-07 NOTE — OB RN DELIVERY SUMMARY - NSSELHIDDEN_OBGYN_ALL_OB_FT
[NS_DeliveryAttending1_OBGYN_ALL_OB_FT:[id number]],[NS_DeliveryAssist1_OBGYN_ALL_OB_FT:[id number]],[NS_DeliveryAssist2_OBGYN_ALL_OB_FT:[id number]] [NS_DeliveryAttending1_OBGYN_ALL_OB_FT:[id number]],[NS_DeliveryAssist1_OBGYN_ALL_OB_FT:[id number]],[NS_DeliveryAssist2_OBGYN_ALL_OB_FT:[id number]],[NS_DeliveryRN_OBGYN_ALL_OB_FT:[id number]]

## 2021-11-07 NOTE — PRE-ANESTHESIA EVALUATION ADULT - NSANTHASARD_GEN_ALL_CORE
Faxed to Dr Herrera - [phone]  
Received forms from [facility] that need to be reviewed and signed.    Please review and sign     Will place in Bin     
Sent to wrong doctor.  
2

## 2021-11-07 NOTE — OB PROVIDER LABOR PROGRESS NOTE - ASSESSMENT
Patient examined at bedside for cervical change  -Cervical change noted  -Continue expectant management for now  -Recheck patient in two hours and consider pitocin at the time     Discussed w Dr. Keren Escobar, PGY-1
EFM + [place]  IUPC placed  Made cervical change will re-evaluate in 2 hours for augmentation  Declines c/s at this time    [place] pgy4 d/w Dr. Aldridge
-cat 1 tracing  -anesthesia consulted for epidural  -expt mgmt     Roberto Carlos PGY2

## 2021-11-07 NOTE — OB RN PATIENT PROFILE - WEIGHT IN KG
104.3 Tazorac Counseling:  Patient advised that medication is irritating and drying.  Patient may need to apply sparingly and wash off after an hour before eventually leaving it on overnight.  The patient verbalized understanding of the proper use and possible adverse effects of tazorac.  All of the patient's questions and concerns were addressed.

## 2021-11-07 NOTE — OB PROVIDER H&P - ASSESSMENT
A/P: Pt is a 41y   presenting with rupture of membranes @1130p.     1. Admit to LND. Routine Labs. IVF  2. Expectant Management  3. Fetus: Cat 1 tracing, Vertex, EFW 3000 . C/w EFM.  4.  GBS neg  6. Covid swab sent    Ashutosh Azevedo PGY2  Discussed with Dr. Aguila

## 2021-11-07 NOTE — OB PROVIDER LABOR PROGRESS NOTE - NS_SUBJECTIVE/OBJECTIVE_OBGYN_ALL_OB_FT
pt seen and examined for cervical change
Patient examined at bedside to assess for cervical change
Pt evaluated at bedside for progression. Fetal head at -3 station. Cervical exam 5-6/80/-3. Tracing had intermittent late decels that resolved with fluid bolus. Offered the patient a cesaren section; however, the patient declined at this time. IUPC was placed.

## 2021-11-07 NOTE — OB PROVIDER H&P - ATTENDING COMMENTS
ATTG on service note    Pt interviewed at the bedside.  Starting to have more frequent and painful ctxs.    Pt is a 40 yo  @ 38.6 wks admitted for SROM in ealry labor for TOLAC.  ROM at 11pm.  [facility] and was recommended to have IOL and pt declined as pt  wanted to TOLAC and wanted to go into "natural labor" and came to [facility] as was close by visiting family  Otherwise uncomplicated as per pt      T(C): 36.9 (21 @ 01:14), Max: 36.9 (21 @ 00:39)  HR: 89 (21 @ 01:14) (89 - 97)  BP: 121/65 (21 @ 01:14) (121/65 - 131/84)  RR: 18 (21 @ 00:39) (18 - 19)  SpO2: 100% (21 @ 00:04) (100% - 100%)        VE-1-2/50/-3  FHT-baseline 120, moderate variability, no decels, +accels  toco-q 3-4 min  EFW-3000 gms (clinical)  Prior baby wa 6 lbs 5 oz  GBS neg    Labs   CBC-7/9.3/29.1/200  MBT-pending  COVID pending    a/p:40 yo  @ 38.6 wks admitted for SROM since 11pm desires TOLAC.  h/o prior c/s for Cat 2 FHT at 7-8 cm.  d/w pt risks for TOLAC including risk for uterine rupture of <1% which can lead to internal bleeding and fetal distress.  Pt stated complete understanding.   ALso offered rpt cs now and pt accepted risks and decline rpt c/s and desires KASI.  Informed consent signed.  GBS neg.  PNC at [facility].  Oligo as per pt  -admit to LnD for TOLAC with SROM  -expectant mngt, will augment as needed with Pitocin  -does not plan to request pain mngf  -recommend early epidural   -GBS neg  -high PPH risk for low Hb/Hct and TOLAC, accepts blood - consent on chart  -SCD for DVT ppx  -girl fetus  -uncertain about birth control  -anticipate         ELENI Aguila

## 2021-11-07 NOTE — OB PROVIDER H&P - NSHPPHYSICALEXAM_GEN_ALL_CORE
Vital Signs Last 24 Hrs  T(C): 36.9 (07 Nov 2021 00:39), Max: 36.9 (07 Nov 2021 00:39)  T(F): 98.4 (07 Nov 2021 00:39), Max: 98.4 (07 Nov 2021 00:39)  HR: 89 (07 Nov 2021 00:39) (89 - 97)  BP: 121/65 (07 Nov 2021 00:39) (121/65 - 131/84)  BP(mean): --  RR: 18 (07 Nov 2021 00:39) (18 - 19)  SpO2: 100% (07 Nov 2021 00:04) (100% - 100%)    Physical Exam:  Gen: NAD, AOx3  CV: RRR  Resp: CTAB  Abd: soft, NT, gravid    Speculum Exam:   +pooling, + nitrazine    SVE: 1.5/50/-3  FHT: 125, moderate, + accels, no decels  [place]: q10min  Sono: vertex

## 2021-11-07 NOTE — OB PROVIDER H&P - PRO RUBELLA INFANT
Case Management Discharge Note                     Final Discharge Disposition Code: 01 - home or self-care   immune

## 2021-11-07 NOTE — OB RN PATIENT PROFILE - NS_SUPPORTPERSONNAME_OBGYN_ALL_OB_FT
Shakira Caldwell was diagnosed with Narcolespy with Cataplexy.Treatment with alertness promoting  medications is recommended as the first step. Different medications were discussed during this visit, specifically, Methylphenidate since it was also studied for POTS and was found to have some benefit.     Will start Methylphenidate IR 10 mg 1 pill 2 times a day - in the morning and early afternoon.      
estefania mejia

## 2021-11-07 NOTE — OB RN TRIAGE NOTE - TERM DELIVERIES, OB PROFILE
Advance Care Planning    Patient's Healthcare Decision Maker is: Named in scanned ACP document   Confirm Advance Directive Yes, on file      Reviewed the current advance care plan document on file with patient and all information is up to date.
1

## 2021-11-07 NOTE — OB PROVIDER DELIVERY SUMMARY - NSSELHIDDEN_OBGYN_ALL_OB_FT
[NS_DeliveryAttending1_OBGYN_ALL_OB_FT:[id number]],[NS_DeliveryAssist1_OBGYN_ALL_OB_FT:[id number]],[NS_DeliveryAssist2_OBGYN_ALL_OB_FT:[id number]]
Detail Level: Zone

## 2021-11-07 NOTE — OB PROVIDER DELIVERY SUMMARY - NSPROVIDERDELIVERYNOTE_OBGYN_ALL_OB_FT
spontaneous vaginal delivery of liveborn infant female from OA position. head, shoulders, body delivered easily. no mec. delayed cord clamping x1min.  passed to RN/mother. blood tubes and cord gasses collected. placenta delivered intact spontaneously. uterine fundus firm. vaginal exam revealed intact cervix, vaginal walls. right sulci, right labial, and left 2nd deg repaired with vicryl. excellent hemostasis appreciated. count correct x2. infant and mother stable. spontaneous vaginal delivery of liveborn infant female from OA position. head, shoulders, body delivered easily. no mec. delayed cord clamping x1min.  passed to RN/mother. blood tubes and cord gasses collected. placenta delivered intact spontaneously. uterine cavity explored, noted to be intact, fundus firm. mild JUAN DAVID atony improved with IM Methergine, IM pit, and bimanual massage. vaginal exam revealed intact cervix, vaginal walls. right sulci, right labial, and left 2nd deg repaired with vicryl. excellent hemostasis appreciated. count correct x2. infant and mother stable. spontaneous vaginal delivery of liveborn infant female from OA position. head, shoulders, body delivered easily. no mec. delayed cord clamping x1min.  passed to RN/mother. blood tubes and cord gasses collected. placenta delivered intact spontaneously. uterine cavity explored, noted to be intact, fundus firm. mild JUAN DAVID atony improved with IM Methergine, IM pit, and bimanual massage. vaginal exam revealed intact cervix, vaginal walls. right sulci, right labial, and left 2nd deg repaired with 2-0 and 3-0 vicryl. excellent hemostasis appreciated. count correct x2. infant and mother stable.

## 2021-11-08 ENCOUNTER — TRANSCRIPTION ENCOUNTER (OUTPATIENT)
Age: 41
End: 2021-11-08

## 2021-11-08 LAB
RUBV IGG SER-ACNC: 1.2 INDEX — SIGNIFICANT CHANGE UP
RUBV IGG SER-IMP: POSITIVE — SIGNIFICANT CHANGE UP

## 2021-11-08 RX ORDER — NORETHINDRONE 0.35 MG/1
1 TABLET ORAL
Qty: 84 | Refills: 3
Start: 2021-11-08

## 2021-11-08 RX ORDER — IBUPROFEN 200 MG
1 TABLET ORAL
Qty: 0 | Refills: 0 | DISCHARGE
Start: 2021-11-08

## 2021-11-08 RX ORDER — ACETAMINOPHEN 500 MG
3 TABLET ORAL
Qty: 0 | Refills: 0 | DISCHARGE
Start: 2021-11-08

## 2021-11-08 RX ADMIN — Medication 600 MILLIGRAM(S): at 00:00

## 2021-11-08 RX ADMIN — Medication 600 MILLIGRAM(S): at 06:43

## 2021-11-08 RX ADMIN — Medication 600 MILLIGRAM(S): at 06:48

## 2021-11-08 RX ADMIN — Medication 975 MILLIGRAM(S): at 10:00

## 2021-11-08 RX ADMIN — OXYCODONE HYDROCHLORIDE 5 MILLIGRAM(S): 5 TABLET ORAL at 22:21

## 2021-11-08 RX ADMIN — Medication 975 MILLIGRAM(S): at 09:29

## 2021-11-08 RX ADMIN — Medication 600 MILLIGRAM(S): at 01:06

## 2021-11-08 RX ADMIN — Medication 975 MILLIGRAM(S): at 16:10

## 2021-11-08 RX ADMIN — Medication 600 MILLIGRAM(S): at 18:21

## 2021-11-08 RX ADMIN — Medication 1 TABLET(S): at 12:25

## 2021-11-08 RX ADMIN — SODIUM CHLORIDE 3 MILLILITER(S): 9 INJECTION INTRAMUSCULAR; INTRAVENOUS; SUBCUTANEOUS at 06:44

## 2021-11-08 RX ADMIN — Medication 600 MILLIGRAM(S): at 12:25

## 2021-11-08 RX ADMIN — Medication 975 MILLIGRAM(S): at 15:39

## 2021-11-08 RX ADMIN — OXYCODONE HYDROCHLORIDE 5 MILLIGRAM(S): 5 TABLET ORAL at 03:40

## 2021-11-08 RX ADMIN — Medication 975 MILLIGRAM(S): at 20:43

## 2021-11-08 RX ADMIN — OXYCODONE HYDROCHLORIDE 5 MILLIGRAM(S): 5 TABLET ORAL at 02:57

## 2021-11-08 RX ADMIN — Medication 600 MILLIGRAM(S): at 12:58

## 2021-11-08 RX ADMIN — OXYCODONE HYDROCHLORIDE 5 MILLIGRAM(S): 5 TABLET ORAL at 06:02

## 2021-11-08 RX ADMIN — OXYCODONE HYDROCHLORIDE 5 MILLIGRAM(S): 5 TABLET ORAL at 06:50

## 2021-11-08 NOTE — DISCHARGE NOTE OB - PLAN OF CARE
After discharge, please stay on pelvic rest for 6 weeks, meaning no sexual intercourse, no tampons and no douching. No driving for 2 weeks as women can loose a lot of blood during delivery and there is a possibility of being lightheaded/fainting.  No lifting objects heavier than baby for two weeks.  Expect to have vaginal bleeding/spotting for up to six weeks.  The bleeding should get lighter and more white/light brown with time.  For bleeding soaking more than a pad an hour or passing clots greater than the size of your fist, come in to the emergency department.    Follow up in clinic in 6 weeks.

## 2021-11-08 NOTE — DISCHARGE NOTE OB - SEVERE ABDOMINAL/VAGINAL AND/OR RECTAL PAIN
fs 44, checked twice by pca, asymptomatic. given juice, will reassess as per protocol and let you know. BP 92/56 BP 95/56 BP 99/64 Blood pressure 89/49 and orthostatic blood pressures Blood pressure 90/43 Patient complained of Right eye pressure that started couple days ago. Patient has hx of syncopal episodes, checking orthostatics q24h Patient's SBP <100 Patient's SBP was less than 100 Patient's SBP was less than <100 Patient's blood pressure was 92/55 Positive orthostatic blood pressure Pt BP was 70/43 manual BP Pt BP was 73/37 Pt BP was 80/45 Pt BP was 80/45 Pt BP was 92/54 Pt had strong foul smelling urine continues with orthostatics positive fs recheck 118. fs recheck 122. fs recheck, 64, he is eating amebr crackers and I will give him more juice and recheck. noted with blood pressure less than 100 noted with low blood pressure 70/40 noted with low blood pressure 70/40 noted with low blood pressure 87/44 noted with low blood pressure 95/55 orthostatics positive pt BP 98/50 pt more lethargic than last night. not baseline. bp 85/50. BP low all day as well pt refusing orthostatics check at this time, says he doesn't need it. reasoning explained to pt, still refusing. pt unresponsive to pain, pamela and low bp refusing orthostatics at this time. unable to reach Flow Cytometry lab unable to reach Flow Cytometry lab regarding proper tube needed for sheet collection Statement Selected

## 2021-11-08 NOTE — DISCHARGE NOTE OB - MEDICATION SUMMARY - MEDICATIONS TO STOP TAKING
I will STOP taking the medications listed below when I get home from the hospital:    cyclobenzaprine 10 mg oral tablet  -- 1 tab(s) by mouth 3 times a day  -- May cause drowsiness.  Alcohol may intensify this effect.  Use care when operating dangerous machinery.  Obtain medical advice before taking any non-prescription drugs as some may affect the action of this medication.    oxyCODONE 5 mg oral tablet  -- 1 tab(s) by mouth every 6 hours, As Needed MDD:4tabs  -- Caution federal law prohibits the transfer of this drug to any person other  than the person for whom it was prescribed.  It is very important that you take or use this exactly as directed.  Do not skip doses or discontinue unless directed by your doctor.  May cause drowsiness.  Alcohol may intensify this effect.  Use care when operating dangerous machinery.  This prescription cannot be refilled.  Using more of this medication than prescribed may cause serious breathing problems.

## 2021-11-08 NOTE — CONSULT NOTE ADULT - SUBJECTIVE AND OBJECTIVE BOX
SUBJECTIVE:  41y Female, with PMHx asthma (has not used inhaler during pregnancy; hospitalized as a child for asthma but denies intubation), admitted at 38wks for SROM in early labor for TOLAC, now POSTOP DAY 1 S/P . Pt had labor epidural placed (21 @ 6:00) which only provided one-sided relief despite top-off (right side numb, left not). Epidural catheter was removed and replaced ( @ 10:30) because pt was not getting adequate analgesia. After delivery, epidural removed, tip intact @ 19:00. Anesthesia consulted because patient complaining of residual right sided numbness.    Patient seen and evaluated at bedside. Localizes numbness to upper outer area of right thigh only. Says that it is improving and gradually becoming less numb. Reports some difficulty ambulating earlier because of the numbness, but says that it's getting better and she was able to get up with assistance this afternoon.        OBJECTIVE:  Vital Signs Last 24 Hrs  T(C): 36.6 (2021 05:00), Max: 37.3 (2021 21:20)  T(F): 97.9 (2021 05:00), Max: 99.1 (2021 21:20)  HR: 80 (2021 05:00) (56 - 96)  BP: 101/69 (2021 05:00) (101/69 - 128/59)  BP(mean): --  RR: 17 (2021 05:00) (17 - 18)  SpO2: 100% (2021 05:00) (98% - 100%)    I&O's Summary    2021 07:01  -  2021 07:00  --------------------------------------------------------  IN: 2300 mL / OUT: 1750 mL / NET: 550 mL    Physical Exam:  GEN: In no acute distress. Sitting up in bed, pleasant and conversing normally.  NEURO/MSK: AAO x 4. Mildly decreased sensation in right upper outer thigh only. DERAS. Strength 4+/5 in RLE, 5/5 in LLE.   RESP: Nonlabored breathing on RA  CVS: S1S2  BACK: epidural site clean, dry, nontender        ASSESSMENT/PLAN:  40yo F, PMHx asthma, POD 1 s/p , complaining of numbness of upper outer right thigh.  - Numbness is in distribution of lateral femoral cutaneous nerve. Stretching or compression of the lateral femoral cutaneous nerve commonly occurs with hyperflexion of the hip, such as during lithotomy position for delivery.  - Unlikely due to residual local anesthetic from epidural, given the number of hours since epidural removal. More likely due to positioning injury.  - Pt reports noticeable improvement already since this morning and says sensation is returning. Recommend conservative management. Pt should have assistance when ambulating until symptoms resolve.  - Had in depth discussion with patient. Patient expresses understanding.  - Feel free to call back with any questions.

## 2021-11-08 NOTE — DISCHARGE NOTE OB - CARE PROVIDER_API CALL
[facility],   [address] [place] [address]  Phone: [phone]  Fax: (   )    -  Follow Up Time: Routine

## 2021-11-08 NOTE — DISCHARGE NOTE OB - PATIENT PORTAL LINK FT
You can access the FollowMyHealth Patient Portal offered by [facility] by registering at the following website: [email or web address]. By joining [facility]’s FollowMyHealth portal, you will also be able to view your health information using other applications (apps) compatible with our system.

## 2021-11-08 NOTE — DISCHARGE NOTE OB - CARE PLAN
Principal Discharge DX:	Vaginal birth after  ()  Assessment and plan of treatment:	After discharge, please stay on pelvic rest for 6 weeks, meaning no sexual intercourse, no tampons and no douching. No driving for 2 weeks as women can loose a lot of blood during delivery and there is a possibility of being lightheaded/fainting.  No lifting objects heavier than baby for two weeks.  Expect to have vaginal bleeding/spotting for up to six weeks.  The bleeding should get lighter and more white/light brown with time.  For bleeding soaking more than a pad an hour or passing clots greater than the size of your fist, come in to the emergency department.    Follow up in clinic in 6 weeks.   1

## 2021-11-08 NOTE — DISCHARGE NOTE OB - MEDICATION SUMMARY - MEDICATIONS TO TAKE
I will START or STAY ON the medications listed below when I get home from the hospital:    acetaminophen 325 mg oral tablet  -- 3 tab(s) by mouth   -- Indication: For Mild Pain    ibuprofen 600 mg oral tablet  -- 1 tab(s) by mouth every 6 hours  -- Indication: For Moderate Pain    Prenatal Multivitamins with Folic Acid 1 mg oral tablet  -- 1 tab(s) by mouth once a day  -- Indication: For Post-partum recovery   I will START or STAY ON the medications listed below when I get home from the hospital:    acetaminophen 325 mg oral tablet  -- 3 tab(s) by mouth   -- Indication: For Mild Pain    ibuprofen 600 mg oral tablet  -- 1 tab(s) by mouth every 6 hours  -- Indication: For Moderate Pain    Prenatal Multivitamins with Folic Acid 1 mg oral tablet  -- 1 tab(s) by mouth once a day  -- Indication: For Post-partum recovery    norethindrone 0.35 mg oral tablet  -- 1 tab(s) by mouth once a day   -- Do not take this drug if you are pregnant.  It is very important that you take or use this exactly as directed.  Do not skip doses or discontinue unless directed by your doctor.    -- Indication: For Birth control   I will START or STAY ON the medications listed below when I get home from the hospital:    walking cane  -- 1 application between cheek and gums once a day   -- Indication: For Ambulation    acetaminophen 325 mg oral tablet  -- 3 tab(s) by mouth   -- Indication: For Mild Pain    ibuprofen 600 mg oral tablet  -- 1 tab(s) by mouth every 6 hours  -- Indication: For Moderate Pain    Prenatal Multivitamins with Folic Acid 1 mg oral tablet  -- 1 tab(s) by mouth once a day  -- Indication: For Post-partum recovery    norethindrone 0.35 mg oral tablet  -- 1 tab(s) by mouth once a day   -- Do not take this drug if you are pregnant.  It is very important that you take or use this exactly as directed.  Do not skip doses or discontinue unless directed by your doctor.    -- Indication: For Birth control

## 2021-11-08 NOTE — PROGRESS NOTE ADULT - SUBJECTIVE AND OBJECTIVE BOX
OB Progress Note:  PPD#1    S: 40yo PPD#1 s/p . Patient feels well. Pain is well controlled. She is tolerating a regular diet and passing flatus. She is voiding spontaneously, and ambulating without difficulty. Denies CP/SOB. Denies HA/lightheadedness/dizziness. Denies N/V. Denies calf pain.    O:  Vitals:  Vital Signs Last 24 Hrs  T(C): 36.6 (2021 05:00), Max: 37.4 (2021 09:27)  T(F): 97.9 (2021 05:00), Max: 99.32 (2021 09:27)  HR: 80 (2021 05:00) (56 - 100)  BP: 101/69 (2021 05:00) (101/54 - 156/105)  BP(mean): --  RR: 17 (2021 05:00) (17 - 18)  SpO2: 100% (2021 05:00) (90% - 100%)    MEDICATIONS  (STANDING):  acetaminophen     Tablet .. 975 milliGRAM(s) Oral <User Schedule>  diphtheria/tetanus/pertussis (acellular) Vaccine (ADAcel) 0.5 milliLiter(s) IntraMuscular once  ibuprofen  Tablet. 600 milliGRAM(s) Oral every 6 hours  oxytocin Infusion 333.333 milliUNIT(s)/Min (1000 mL/Hr) IV Continuous <Continuous>  prenatal multivitamin 1 Tablet(s) Oral daily  sodium chloride 0.9% lock flush 3 milliLiter(s) IV Push every 8 hours      Labs:  Blood type: O Positive  Rubella IgG: RPR: Negative                          9.3<L>   7.74 >-----------< 200    (  @ 01:56 )             29.1<L>        Physical Exam:  General: NAD  CV: RRR  Pulm: CTAB  Abdomen: soft, non-tender, non-distended, fundus firm @umbilicus  Vaginal: lochia wnl, exam deferred  Extremities: no erythema/calf tenderness     OB Progress Note:  PPD#1    S: 42yo PPD#1 s/p . Patient feels well. Pain is well controlled. She is tolerating a regular diet and passing flatus. Patient had an epidural during delivery and is reporting numbness in the right leg that is somewhat improved but still present. She has been voiding spontaneously in a bedpan but has not yet ambulated secondary to her right leg numbness. Denies CP/SOB. Denies HA/lightheadedness/dizziness. Denies N/V. Denies calf pain.    O:  Vitals:  Vital Signs Last 24 Hrs  T(C): 36.6 (2021 05:00), Max: 37.4 (2021 09:27)  T(F): 97.9 (2021 05:00), Max: 99.32 (2021 09:27)  HR: 80 (2021 05:00) (56 - 100)  BP: 101/69 (2021 05:00) (101/54 - 156/105)  BP(mean): --  RR: 17 (2021 05:00) (17 - 18)  SpO2: 100% (2021 05:00) (90% - 100%)    MEDICATIONS  (STANDING):  acetaminophen     Tablet .. 975 milliGRAM(s) Oral <User Schedule>  diphtheria/tetanus/pertussis (acellular) Vaccine (ADAcel) 0.5 milliLiter(s) IntraMuscular once  ibuprofen  Tablet. 600 milliGRAM(s) Oral every 6 hours  oxytocin Infusion 333.333 milliUNIT(s)/Min (1000 mL/Hr) IV Continuous <Continuous>  prenatal multivitamin 1 Tablet(s) Oral daily  sodium chloride 0.9% lock flush 3 milliLiter(s) IV Push every 8 hours      Labs:  Blood type: O Positive  Rubella IgG: RPR: Negative                          9.3<L>   7.74 >-----------< 200    (  @ 01:56 )             29.1<L>        Physical Exam:  General: NAD  CV: RRR  Pulm: CTAB  Abdomen: soft, non-tender, non-distended, fundus firm @umbilicus  Vaginal: lochia wnl, exam deferred  Extremities: no erythema/calf tenderness

## 2021-11-08 NOTE — DISCHARGE NOTE OB - HOSPITAL COURSE
Patient was admitted to L+D for after having rupture of membranes and developing contractions while in the area. Of note, patient had reportedly been following with a Dr. Du of [place] with plans to deliver at [facility]. Pt desired a TOLAC. Patient had a successful  requiring IM Methergine and IM Pitocin  EBL: 400  Hct: 29.1  On Postpartum day 1, patient was discharged home in stable condition, voiding spontaneously, pain well controlled, ambulating, tolerating PO and with normal vital signs. Patient's postpartum birth control plan is PoPs. Pt plans to [facility] up in [facility] in 6 weeks. Telephone number and clinic information provided prior to discharge.  Patient was admitted to L+D for after having rupture of membranes and developing contractions while in the area. Of note, patient had reportedly been following with a Dr. Du of [place] with plans to deliver at [facility]. Pt desired a TOLAC. Patient had a successful  requiring IM Methergine and IM Pitocin  EBL: 400  Hct: 29.1  On Postpartum day 1, patient was discharged home in stable condition, voiding spontaneously, pain well controlled, tolerating PO and with normal vital signs. Patient's postpartum birth control plan is PoPs. Pt was evaluated on PPD#2 by PT for right lower extremity numbness. Recommended rolling walker but no need for outpatient PT. Rx for rolling walker provided. Pt plans to follow up in [facility] in 6 weeks. Telephone number and clinic information provided prior to discharge.

## 2021-11-08 NOTE — PROGRESS NOTE ADULT - ASSESSMENT
A/P: 42yo  PPD#1 s/p .  Patient is stable and doing well post-partum.    - Birth control: patient would like to take Micronor  - Pain well controlled, continue current pain regimen  - Increase ambulation, SCDs when not ambulating  - Continue regular diet  - Discharge planning    RAISA Ennis PGY1 A/P: 42yo  PPD#1 s/p .  Patient is stable and doing well post-partum.    - R leg numbness: will consult anesthesia  - Birth control: patient would like to take Micronor  - Pain well controlled, continue current pain regimen  - Start ambulation, SCDs when not ambulating  - Continue regular diet    RAISA Ennis PGY1

## 2021-11-08 NOTE — DISCHARGE NOTE OB - PROVIDER TOKENS
FREE:[LAST:[[facility]],PHONE:[phone]],FAX:[(   )    -],ADDRESS:[[address] [place] [address]],FOLLOWUP:[Routine]]

## 2021-11-09 VITALS — SYSTOLIC BLOOD PRESSURE: 109 MMHG | DIASTOLIC BLOOD PRESSURE: 79 MMHG

## 2021-11-09 PROCEDURE — 59050 FETAL MONITOR W/REPORT: CPT

## 2021-11-09 PROCEDURE — 86703 HIV-1/HIV-2 1 RESULT ANTBDY: CPT

## 2021-11-09 PROCEDURE — 87340 HEPATITIS B SURFACE AG IA: CPT

## 2021-11-09 PROCEDURE — 87635 SARS-COV-2 COVID-19 AMP PRB: CPT

## 2021-11-09 PROCEDURE — 86780 TREPONEMA PALLIDUM: CPT

## 2021-11-09 PROCEDURE — 86769 SARS-COV-2 COVID-19 ANTIBODY: CPT

## 2021-11-09 PROCEDURE — 86850 RBC ANTIBODY SCREEN: CPT

## 2021-11-09 PROCEDURE — G0463: CPT

## 2021-11-09 PROCEDURE — 86901 BLOOD TYPING SEROLOGIC RH(D): CPT

## 2021-11-09 PROCEDURE — 59025 FETAL NON-STRESS TEST: CPT

## 2021-11-09 PROCEDURE — 85025 COMPLETE CBC W/AUTO DIFF WBC: CPT

## 2021-11-09 PROCEDURE — 86762 RUBELLA ANTIBODY: CPT

## 2021-11-09 PROCEDURE — 36415 COLL VENOUS BLD VENIPUNCTURE: CPT

## 2021-11-09 PROCEDURE — 86900 BLOOD TYPING SEROLOGIC ABO: CPT

## 2021-11-09 PROCEDURE — 97161 PT EVAL LOW COMPLEX 20 MIN: CPT

## 2021-11-09 RX ADMIN — Medication 975 MILLIGRAM(S): at 09:02

## 2021-11-09 RX ADMIN — Medication 600 MILLIGRAM(S): at 11:55

## 2021-11-09 RX ADMIN — Medication 975 MILLIGRAM(S): at 15:16

## 2021-11-09 RX ADMIN — Medication 975 MILLIGRAM(S): at 03:15

## 2021-11-09 RX ADMIN — Medication 975 MILLIGRAM(S): at 16:00

## 2021-11-09 RX ADMIN — Medication 1 TABLET(S): at 11:55

## 2021-11-09 RX ADMIN — Medication 600 MILLIGRAM(S): at 06:12

## 2021-11-09 RX ADMIN — Medication 975 MILLIGRAM(S): at 09:40

## 2021-11-09 RX ADMIN — Medication 975 MILLIGRAM(S): at 06:12

## 2021-11-09 RX ADMIN — Medication 600 MILLIGRAM(S): at 00:14

## 2021-11-09 RX ADMIN — Medication 600 MILLIGRAM(S): at 05:45

## 2021-11-09 RX ADMIN — Medication 600 MILLIGRAM(S): at 17:53

## 2021-11-09 RX ADMIN — Medication 600 MILLIGRAM(S): at 12:40

## 2021-11-09 NOTE — PHYSICAL THERAPY INITIAL EVALUATION ADULT - ACTIVE RANGE OF MOTION EXAMINATION, REHAB EVAL
decreased R hip and knee ROM/bilateral upper extremity Active ROM was WFL (within functional limits)/bilateral  lower extremity Active ROM was WFL (within functional limits)

## 2021-11-09 NOTE — PROGRESS NOTE ADULT - SUBJECTIVE AND OBJECTIVE BOX
OB Progress Note:  PPD#2    S: 42yo  PPD#2 s/p . Patient feels well. Pain is well controlled. She is tolerating a regular diet and passing flatus. She is voiding spontaneously, and ambulating without difficulty. Denies CP/SOB. Denies HA/lightheadedness/dizziness. Denies N/V. Denies calf pain. Patient observed ambulating independently.    O:  Vitals:   Vital Signs Last 24 Hrs  T(C): 36.8 (2021 05:00), Max: 36.8 (2021 05:00)  T(F): 98.3 (2021 05:00), Max: 98.3 (2021 05:00)  HR: 69 (2021 05:00) (69 - 78)  BP: 109/79 (2021 06:21) (93/62 - 109/79)  BP(mean): 67 (2021 06:21) (67 - 67)  RR: 17 (2021 05:00) (17 - 18)  SpO2: 99% (2021 05:00) (98% - 99%)    MEDICATIONS  (STANDING):  acetaminophen     Tablet .. 975 milliGRAM(s) Oral <User Schedule>  diphtheria/tetanus/pertussis (acellular) Vaccine (ADAcel) 0.5 milliLiter(s) IntraMuscular once  ibuprofen  Tablet. 600 milliGRAM(s) Oral every 6 hours  oxytocin Infusion 333.333 milliUNIT(s)/Min (1000 mL/Hr) IV Continuous <Continuous>  prenatal multivitamin 1 Tablet(s) Oral daily  sodium chloride 0.9% lock flush 3 milliLiter(s) IV Push every 8 hours    MEDICATIONS  (PRN):  benzocaine 20%/menthol 0.5% Spray 1 Spray(s) Topical every 6 hours PRN for Perineal discomfort  dibucaine 1% Ointment 1 Application(s) Topical every 6 hours PRN Perineal discomfort  diphenhydrAMINE 25 milliGRAM(s) Oral every 6 hours PRN Pruritus  hydrocortisone 1% Cream 1 Application(s) Topical every 6 hours PRN Moderate Pain (4-6)  lanolin Ointment 1 Application(s) Topical every 6 hours PRN nipple soreness  magnesium hydroxide Suspension 30 milliLiter(s) Oral two times a day PRN Constipation  oxyCODONE    IR 5 milliGRAM(s) Oral every 3 hours PRN Moderate to Severe Pain (4-10)  oxyCODONE    IR 5 milliGRAM(s) Oral once PRN Moderate to Severe Pain (4-10)  pramoxine 1%/zinc 5% Cream 1 Application(s) Topical every 4 hours PRN Moderate Pain (4-6)  simethicone 80 milliGRAM(s) Chew every 4 hours PRN Gas  witch hazel Pads 1 Application(s) Topical every 4 hours PRN Perineal discomfort      Labs:  Blood type: O Positive  Rubella IgG: RPR: Negative                          9.3<L>   7.74 >-----------< 200    (  @ 01:56 )             29.1<L>          Physical Exam:  General: NAD  CV: RRR  Pulm: CTAB  Abdomen: soft, non-tender, non-distended, fundus firm @umbilicus  Vaginal: lochia wnl, exam deferred  Extremities: Equal LE strength 5/5. Patient still reporting some numbness in outer right thigh but it is improving. Equal sensation to LE calves. No erythema/calf tenderness

## 2021-11-09 NOTE — PHYSICAL THERAPY INITIAL EVALUATION ADULT - ADDITIONAL COMMENTS
Pt lives with spouse and son in private home; states sister will also be staying with them to assist. Few small steps to enter home and full flight to second floor. Pt states she can stay downstairs but prefers upstairs; will have assistance on stairs. Pt independent prior to admission.

## 2021-11-09 NOTE — PROGRESS NOTE ADULT - ASSESSMENT
A/P: 40yo  PPD#2 s/p  c/b R lateral femoral cutaneous nerve compression.  Patient is stable and doing well post-partum.      - R lat fem nerve compression: improving. Patient able to ambulate without assistance.  - Pain well controlled, continue current pain regimen  - Increase ambulation, SCDs when not ambulating  - Continue regular diet  - Discharge planning     RAISA Ennis PGY1

## 2021-11-09 NOTE — PROGRESS NOTE ADULT - ATTENDING COMMENTS
Patient doing well, out of bed w support  Pt notes weakness improved from yesterday but not resolved  No HA, CP, SOB  No heavy vaginal bleeding (VB)/normal lochia    ICU Vital Signs Last 24 Hrs  T(C): 36.8 (2021 05:00), Max: 36.8 (2021 05:00)  HR: 69 (2021 05:00) (69 - 78)  BP: 109/79 (2021 06:21) (93/62 - 109/79)  BP(mean): 67 (2021 06:21) (67 - 67)  RR: 17 (2021 05:00) (17 - 18)  SpO2: 99% (2021 05:00) (98% - 99%)    NAD  Abd: fundus firm  ext: nontender, minimal edema, FROM but decreased on right    PPD # 2 s/p  w residual unilateral leg weak ness  Patient seen and evaluated by me. I agree with resident note unless otherwise stated.   Routine postpartum care, regular diet as tolerated, ambulate and pain control as needed.   --discussed w pt and agree to cane for support.   --would not hold baby while standing until back to baseline strength.     MARCUS Lima MD  Attending
Agree with above, patient seen by me. Patient S/P  and methergine x 1 PPD#1. Patient stable. Pt has normal lochia, + breastfeeding, diffliculty moving right leg, still  numb. PE: Pt in NAD, + mobility of left leg, decreased mobility of right leg, no c/c/e B/L, neg calf tenderness b/l.   Anesthesia consult. Patient delivered late yesterday. Counseled patient to call nurses when planning to get out of bed.  Motrin/tylenol for pain   Plan for discharge home PPD#2.

## 2021-11-09 NOTE — PHYSICAL THERAPY INITIAL EVALUATION ADULT - PLANNED THERAPY INTERVENTIONS, PT EVAL
GOAL: Pt will independently negotiate stairs with 1 handrail with step to pattern in 2wks./balance training/gait training/strengthening/transfer training

## 2022-03-11 ENCOUNTER — EMERGENCY (EMERGENCY)
Facility: HOSPITAL | Age: 42
LOS: 0 days | Discharge: HOME | End: 2022-03-12
Attending: STUDENT IN AN ORGANIZED HEALTH CARE EDUCATION/TRAINING PROGRAM
Payer: MEDICAID

## 2022-03-11 VITALS
HEIGHT: 66 IN | WEIGHT: 220.02 LBS | DIASTOLIC BLOOD PRESSURE: 89 MMHG | SYSTOLIC BLOOD PRESSURE: 124 MMHG | OXYGEN SATURATION: 100 % | HEART RATE: 87 BPM | TEMPERATURE: 98 F | RESPIRATION RATE: 15 BRPM

## 2022-03-11 DIAGNOSIS — Z98.891 HISTORY OF UTERINE SCAR FROM PREVIOUS SURGERY: Chronic | ICD-10-CM

## 2022-03-11 DIAGNOSIS — O20.9 HEMORRHAGE IN EARLY PREGNANCY, UNSPECIFIED: ICD-10-CM

## 2022-03-11 PROCEDURE — 99285 EMERGENCY DEPT VISIT HI MDM: CPT

## 2022-03-11 NOTE — ED ADULT TRIAGE NOTE - CHIEF COMPLAINT QUOTE
Pt states, "I'm bleeding so I think I had a miscarriage". Pt states she is 5 weeks pregnant. Complains of "a little headache here and there".

## 2022-03-12 VITALS
HEART RATE: 69 BPM | SYSTOLIC BLOOD PRESSURE: 113 MMHG | OXYGEN SATURATION: 100 % | TEMPERATURE: 98 F | RESPIRATION RATE: 15 BRPM | DIASTOLIC BLOOD PRESSURE: 49 MMHG

## 2022-03-12 PROBLEM — O03.9 COMPLETE OR UNSPECIFIED SPONTANEOUS ABORTION WITHOUT COMPLICATION: Chronic | Status: ACTIVE | Noted: 2021-11-07

## 2022-03-12 LAB
ALBUMIN SERPL ELPH-MCNC: 3.3 G/DL — SIGNIFICANT CHANGE UP (ref 3.3–5)
ALP SERPL-CCNC: 79 U/L — SIGNIFICANT CHANGE UP (ref 40–120)
ALT FLD-CCNC: 21 U/L — SIGNIFICANT CHANGE UP (ref 12–78)
ANION GAP SERPL CALC-SCNC: 5 MMOL/L — SIGNIFICANT CHANGE UP (ref 5–17)
APPEARANCE UR: CLEAR — SIGNIFICANT CHANGE UP
AST SERPL-CCNC: 17 U/L — SIGNIFICANT CHANGE UP (ref 15–37)
BACTERIA # UR AUTO: ABNORMAL
BASOPHILS # BLD AUTO: 0.03 K/UL — SIGNIFICANT CHANGE UP (ref 0–0.2)
BASOPHILS NFR BLD AUTO: 0.6 % — SIGNIFICANT CHANGE UP (ref 0–2)
BILIRUB SERPL-MCNC: 0.1 MG/DL — LOW (ref 0.2–1.2)
BILIRUB UR-MCNC: NEGATIVE — SIGNIFICANT CHANGE UP
BLD GP AB SCN SERPL QL: SIGNIFICANT CHANGE UP
BUN SERPL-MCNC: 18 MG/DL — SIGNIFICANT CHANGE UP (ref 7–23)
CALCIUM SERPL-MCNC: 8.7 MG/DL — SIGNIFICANT CHANGE UP (ref 8.5–10.1)
CHLORIDE SERPL-SCNC: 107 MMOL/L — SIGNIFICANT CHANGE UP (ref 96–108)
CO2 SERPL-SCNC: 27 MMOL/L — SIGNIFICANT CHANGE UP (ref 22–31)
COLOR SPEC: YELLOW — SIGNIFICANT CHANGE UP
CREAT SERPL-MCNC: 0.82 MG/DL — SIGNIFICANT CHANGE UP (ref 0.5–1.3)
DIFF PNL FLD: ABNORMAL
EGFR: 92 ML/MIN/1.73M2 — SIGNIFICANT CHANGE UP
EOSINOPHIL # BLD AUTO: 0.13 K/UL — SIGNIFICANT CHANGE UP (ref 0–0.5)
EOSINOPHIL NFR BLD AUTO: 2.5 % — SIGNIFICANT CHANGE UP (ref 0–6)
EPI CELLS # UR: SIGNIFICANT CHANGE UP
GLUCOSE SERPL-MCNC: 112 MG/DL — HIGH (ref 70–99)
GLUCOSE UR QL: NEGATIVE MG/DL — SIGNIFICANT CHANGE UP
HCG SERPL-ACNC: 560 MIU/ML — HIGH
HCT VFR BLD CALC: 30.8 % — LOW (ref 34.5–45)
HGB BLD-MCNC: 9.8 G/DL — LOW (ref 11.5–15.5)
IMM GRANULOCYTES NFR BLD AUTO: 0.2 % — SIGNIFICANT CHANGE UP (ref 0–1.5)
KETONES UR-MCNC: NEGATIVE — SIGNIFICANT CHANGE UP
LEUKOCYTE ESTERASE UR-ACNC: ABNORMAL
LIDOCAIN IGE QN: 123 U/L — SIGNIFICANT CHANGE UP (ref 73–393)
LYMPHOCYTES # BLD AUTO: 2.05 K/UL — SIGNIFICANT CHANGE UP (ref 1–3.3)
LYMPHOCYTES # BLD AUTO: 40.1 % — SIGNIFICANT CHANGE UP (ref 13–44)
MCHC RBC-ENTMCNC: 25.9 PG — LOW (ref 27–34)
MCHC RBC-ENTMCNC: 31.8 G/DL — LOW (ref 32–36)
MCV RBC AUTO: 81.5 FL — SIGNIFICANT CHANGE UP (ref 80–100)
MONOCYTES # BLD AUTO: 0.35 K/UL — SIGNIFICANT CHANGE UP (ref 0–0.9)
MONOCYTES NFR BLD AUTO: 6.8 % — SIGNIFICANT CHANGE UP (ref 2–14)
NEUTROPHILS # BLD AUTO: 2.54 K/UL — SIGNIFICANT CHANGE UP (ref 1.8–7.4)
NEUTROPHILS NFR BLD AUTO: 49.8 % — SIGNIFICANT CHANGE UP (ref 43–77)
NITRITE UR-MCNC: NEGATIVE — SIGNIFICANT CHANGE UP
NRBC # BLD: 0 /100 WBCS — SIGNIFICANT CHANGE UP (ref 0–0)
PH UR: 6 — SIGNIFICANT CHANGE UP (ref 5–8)
PLATELET # BLD AUTO: 225 K/UL — SIGNIFICANT CHANGE UP (ref 150–400)
POTASSIUM SERPL-MCNC: 4 MMOL/L — SIGNIFICANT CHANGE UP (ref 3.5–5.3)
POTASSIUM SERPL-SCNC: 4 MMOL/L — SIGNIFICANT CHANGE UP (ref 3.5–5.3)
PROT SERPL-MCNC: 6.9 GM/DL — SIGNIFICANT CHANGE UP (ref 6–8.3)
PROT UR-MCNC: 30 MG/DL
RBC # BLD: 3.78 M/UL — LOW (ref 3.8–5.2)
RBC # FLD: 15.7 % — HIGH (ref 10.3–14.5)
RBC CASTS # UR COMP ASSIST: SIGNIFICANT CHANGE UP /HPF (ref 0–4)
SODIUM SERPL-SCNC: 139 MMOL/L — SIGNIFICANT CHANGE UP (ref 135–145)
SP GR SPEC: 1.02 — SIGNIFICANT CHANGE UP (ref 1.01–1.02)
UROBILINOGEN FLD QL: NEGATIVE MG/DL — SIGNIFICANT CHANGE UP
WBC # BLD: 5.11 K/UL — SIGNIFICANT CHANGE UP (ref 3.8–10.5)
WBC # FLD AUTO: 5.11 K/UL — SIGNIFICANT CHANGE UP (ref 3.8–10.5)
WBC UR QL: SIGNIFICANT CHANGE UP

## 2022-03-12 PROCEDURE — 76830 TRANSVAGINAL US NON-OB: CPT | Mod: 26

## 2022-03-12 RX ORDER — SODIUM CHLORIDE 9 MG/ML
1000 INJECTION INTRAMUSCULAR; INTRAVENOUS; SUBCUTANEOUS ONCE
Refills: 0 | Status: COMPLETED | OUTPATIENT
Start: 2022-03-12 | End: 2022-03-12

## 2022-03-12 RX ORDER — CEPHALEXIN 500 MG
500 CAPSULE ORAL ONCE
Refills: 0 | Status: COMPLETED | OUTPATIENT
Start: 2022-03-12 | End: 2022-03-12

## 2022-03-12 RX ADMIN — SODIUM CHLORIDE 1000 MILLILITER(S): 9 INJECTION INTRAMUSCULAR; INTRAVENOUS; SUBCUTANEOUS at 05:59

## 2022-03-12 RX ADMIN — SODIUM CHLORIDE 1000 MILLILITER(S): 9 INJECTION INTRAMUSCULAR; INTRAVENOUS; SUBCUTANEOUS at 01:42

## 2022-03-12 RX ADMIN — Medication 500 MILLIGRAM(S): at 05:59

## 2022-03-12 NOTE — ED ADULT NURSE NOTE - OBJECTIVE STATEMENT
assisting primary RN Lenin, 40yo F, Aox4, hx of asthma, nkda, presents to ED as pt states, "I'm bleeding, passed clots, so I think I had a miscarriage". Pt states she is 5 weeks pregnant. Complains of "a little headache here and there".  Pt has a total of 7 pregnancies, 3 miscarriages, 2 live births, and 1 ectopic.

## 2022-03-12 NOTE — ED ADULT NURSE NOTE - NSIMPLEMENTINTERV_GEN_ALL_ED
Implemented All Universal Safety Interventions:  [place] to call system. Call bell, personal items and telephone within reach. Instruct patient to call for assistance. Room bathroom lighting operational. Non-slip footwear when patient is off stretcher. Physically safe environment: no spills, clutter or unnecessary equipment. Stretcher in lowest position, wheels locked, appropriate side rails in place.

## 2022-03-12 NOTE — ED PROVIDER NOTE - PATIENT PORTAL LINK FT
You can access the FollowMyHealth Patient Portal offered by [facility] by registering at the following website: [email or web address]. By joining [facility]’s FollowMyHealth portal, you will also be able to view your health information using other applications (apps) compatible with our system.

## 2022-03-12 NOTE — ED ADULT NURSE NOTE - NSSEPSISNEWALTERMENTAL_ED_A_ED
No
Deterioration of Condition En Route/Transportation Risk (There is always a risk of traffic delays resulting in deterioration of condition.)

## 2022-03-12 NOTE — ED PROVIDER NOTE - NSFOLLOWUPINSTRUCTIONS_ED_ALL_ED_FT
Progress





- Progress Note


Progress Note: 


Patient is received as a sign out from Dr. Hunter to Dr. Canas at 1900 2/10/20 

shift change pending cervical spine CT, head CTA, and brain MRI. 





CERVICAL SPINE CT IMPRESSION: 


Negative CT cervical spine. No fracture or subluxation is evident and no spinal 


or foraminal stenosis. 


THIS REPORT WAS REVIEWED BY ED PHYSICIAN. 





HEAD CTA 


IMPRESSION: 


Negative CTA head. No significant stenosis and no occlusion. 


THIS REPORT WAS REVIEWED BY ED PHYSICIAN. 





BRAIN MRI 


IMPRESSION: 


No acute intracranial abnormality.


THIS REPORT WAS REVIEWED BY ED PHYSICIAN. 





2240 - Patient's case was discussed with Dr. Ureña. Dr. Ureña recommends 

admission to [facility], he will consult in the morning. 





2241 - Patient's case was discussed with Dr. Gabe Brunson, Dr. Brunson accepts for 

admission. 





Course/Dx





- Course


Course Of Treatment: 18-year-old female signed out at change of shift to me 

with neurological findings.  She is complaining of a headache since being 

struck in the head Saturday while skiing.  She had a ski strike her head.  She 

did have a helmet on.  No loss of consciousness.  Was diagnosed with a 

concussion at urgent care afterwards.  She continues to have slow speech.  

Weakness in her left arm and leg.  Abnormal gait.  Patient had a normal CT 

head.  CTA head and neck and MRI of the brain ordered.  Studies returned 

normal.  Patient still with symptoms and abnormal gait.  Discussed with 

neurology, Dr. Ureña.  Neurology agreed to admission with a consult in the 

morning.  Patient referred to Dr. Valenzuela, hospitalist for admission





- Diagnoses


Provider Diagnoses: 


 Severe concussion, Left hemiparesis








- Provider Notifications


Discussed Care Of Patient With: Alphonse Ureña


Time Discussed With Above Provider: 22:40


Instructed by Provider To: Other - 2240 - Patient's case was discussed with Dr. Ureña. Dr. Ureña recommends admission to [facility], he will consult in the morning. 

2241 - Patient's case was discussed with Dr. Gabe Brunosn, Dr. Brunson accepts for 

admission.





Discharge ED





- Sign-Out/Discharge


Documenting (check all that apply): Patient Departure - admit





- Discharge Plan


Condition: Stable


Disposition: ADMITTED TO [facility]





- Billing Disposition and Condition


Condition: STABLE


Disposition: Admitted to [facility]





- Attestation Statements


Document Initiated by Scribe: Yes


Documenting Scribe: JI ROMERO


Provider For Whom Scribe is Documenting (Include Credential): BERTA CANAS MD


Scribe Attestation: 


I, JI ROMERO, scribed for BERTA CANAS MD on 02/11/20 at 0340. 


Scribe Documentation Reviewed: Yes


Provider Attestation: 


The documentation as recorded by the scribeJI accurately reflects 

the service I personally performed and the decisions made by me, BERTA CANAS MD


Status of Scribe Document: Viewed Follow up with ob/gyn or in ER within 48 hours for repeat bHCG (pregnancy hormone) check.       Vaginal Bleeding During Pregnancy, First Trimester      A small amount of bleeding from the vagina, or spotting, is common during early pregnancy. Some bleeding may be related to the pregnancy, and some may not. In many cases, the bleeding is normal and is not a problem. However, bleeding can also be a sign of something serious.    Normal things that may cause bleeding during the first trimester:  •Implantation of the fertilized egg in the lining of the uterus.      •Rapid changes in blood vessels. This is caused by changes that are happening to the body during pregnancy.      •Sex.      •Pelvic exams.      Abnormal things that may cause bleeding during the first trimester include:  •Infection or inflammation of the cervix.      •Growths or polyps on the cervix.      •Miscarriage or threatened miscarriage.      •Pregnancy that is growing outside of the uterus (ectopic pregnancy).      •A fertilized egg that becomes a mass of tissue (molar pregnancy).      Tell your health care provider right away if there is any bleeding from your vagina.      Follow these instructions at home:      Monitoring your bleeding      Monitor your bleeding.  •Pay attention to any changes in your symptoms. Let your health care provider know about any concerns.      •Try to understand when the bleeding occurs. Does the bleeding start on its own, or does it start after something is done, such as sex or a pelvic exam?    •Use a diary to record the things you see about your bleeding, including:   •The kind of bleeding you are having. Does the bleeding start and stop irregularly, or is it a constant flow?      •The severity of your bleeding. Is the bleeding heavy or light?      •The number of pads you use each day, how often you change them, and how soaked they are.        •Tell your health care provider if you pass tissue. He or she may want to see it.      Activity     •Follow instructions from your health care provider about limiting your activity. Ask what activities are safe for you.      • Do not have sex until your health care provider says that this is safe.      •If needed, make plans for someone to help with your regular activities.      General instructions     •Take over-the-counter and prescription medicines only as told by your health care provider.      • Do not take aspirin because it can cause bleeding.      • Do not use tampons or douche.      •Keep all follow-up visits. This is important.        Contact a health care provider if:    •You have vaginal bleeding during any part of your pregnancy.      •You have cramps or labor pains.      •You have a fever or chills.        Get help right away if:    •You have severe cramps in your back or abdomen.      •You pass large clots or a large amount of tissue from your vagina.      •Your bleeding increases.      •You feel light-headed or weak, or you faint.      •You are leaking fluid or have a gush of fluid from your vagina.        Summary    •A small amount of bleeding from the vagina is common during early pregnancy.      •Be sure to tell your health care provider about any vaginal bleeding right away.      •Try to understand when bleeding occurs. Does bleeding occur on its own, or does it occur after something is done, such as sex or pelvic exams?      •Keep all follow-up visits. This is important.      This information is not intended to replace advice given to you by your health care provider. Make sure you discuss any questions you have with your health care provider.

## 2022-03-12 NOTE — ED PROVIDER NOTE - PHYSICAL EXAMINATION
VITALS: reviewed  GEN: NAD, A & O x 4  HEAD/EYES: NCAT, PERRL, EOMI, anicteric sclerae, no conjunctival pallor  ENT: mucus membranes moist, oropharynx WNL, trachea midline, no JVD  RESP: lungs CTA with equal breath sounds bilaterally, chest wall nontender and atraumatic  CV: heart with reg rhythm S1, S2, no murmur; distal pulses intact and symmetric bilaterally  ABDOMEN: normoactive bowel sounds, soft, nondistended, nontender, no palpable masses  : no CVAT, normal external genitalia, no active bleeding, quarter sized clot removed from vaginal vault, (chaperone:   MSK: extremities atraumatic and nontender, no edema, no asymmetry. the back is without midline or lateral tenderness, there is no spinal deformity or stepoff and the back is ranged painlessly. the neck has no midline tenderness, deformity, or stepoff, and is ranged painlessly.  SKIN: warm, dry, no rash, no bruising, no cyanosis. color appropriate for ethnicity  NEURO: alert, mentating appropriately, no facial asymmetry. gross sensation, motor, coordination are intact  PSYCH: Affect appropriate

## 2022-03-12 NOTE — ED PROVIDER NOTE - OBJECTIVE STATEMENT
40 yo F  lmp 1/10/22, presenting with complaints of vaginal bleeding x 2 days. Reports passing clots, + pregnancy test this week. Denies abdominal pain.

## 2022-03-14 LAB
CULTURE RESULTS: SIGNIFICANT CHANGE UP
SPECIMEN SOURCE: SIGNIFICANT CHANGE UP

## 2022-03-16 ENCOUNTER — EMERGENCY (EMERGENCY)
Facility: HOSPITAL | Age: 42
LOS: 1 days | Discharge: HOME | End: 2022-03-16
Admitting: EMERGENCY MEDICINE
Payer: MEDICAID

## 2022-03-16 VITALS
DIASTOLIC BLOOD PRESSURE: 70 MMHG | HEART RATE: 88 BPM | OXYGEN SATURATION: 100 % | TEMPERATURE: 98 F | SYSTOLIC BLOOD PRESSURE: 121 MMHG | HEIGHT: 66 IN | RESPIRATION RATE: 16 BRPM

## 2022-03-16 DIAGNOSIS — Z98.891 HISTORY OF UTERINE SCAR FROM PREVIOUS SURGERY: Chronic | ICD-10-CM

## 2022-03-16 PROCEDURE — 99285 EMERGENCY DEPT VISIT HI MDM: CPT

## 2022-03-16 NOTE — CHART NOTE - NSCHARTNOTEFT_GEN_A_CORE
[facility] OBGYN in House     Pt unknown to me.  Called by transfer center and connected to [facility] ED for clinical question re: patient.    Pt is a  LMP uncertain (told ED provider  corresponding to 6.5wks, told sono tech  corresponding to 4.5wks), presenting to ED with vaginal bleeding.  Review of previous recrods reveals pt to have OB hx significant for  pLTCS, 2018 ectopic treated with left salpingectomy,  sAB,  .    TVUS in ED: pregnancy of unknown location.  right adnexal structure adjacent to the right ovary 1.2x0.8x0.8cm without vascularity, paraovarian cyst vs. hydrosalpinx.    H/H 9.8/30.8 - stable from previous admission.    O pos    Pt has not yet been examined in ED.  Advised ED provider need details of exam before any recommendations can be made for pt disposition.  Once exam performed ED should contact transfer center to speak to doctor on call for GYN to determine plan of care.  Would need good contact number for patient and name of patient's GYN for follow-up.    MD Belen
OB/GYN Contact Note    Patient called to confirm that she was able to come to the ED to follow-up her beta HCG. Spoke with patient on the phone and they confirmed they come in today. Will continue to follow until beta HCG is negative.    HCG Quantitative, Serum: 560 (03-12-22 @ 01:54)      Argelia Morse, PGY-2    D/W Dr Jones
R3 GYN Chart Note    Spoke to patient at 12pm to confirm follow up for HCG.  Patient initially seen at [facility] on 3/12 and was recommended for 48hr follow up.  Patient states she will come to [facility] today for follow up HCG level.    Josesito Rodirguez, PGY3

## 2022-03-17 LAB
ANION GAP SERPL CALC-SCNC: 11 MMOL/L — SIGNIFICANT CHANGE UP (ref 7–14)
BASOPHILS # BLD AUTO: 0.02 K/UL — SIGNIFICANT CHANGE UP (ref 0–0.2)
BASOPHILS NFR BLD AUTO: 0.4 % — SIGNIFICANT CHANGE UP (ref 0–2)
BUN SERPL-MCNC: 17 MG/DL — SIGNIFICANT CHANGE UP (ref 7–23)
CALCIUM SERPL-MCNC: 9.1 MG/DL — SIGNIFICANT CHANGE UP (ref 8.4–10.5)
CHLORIDE SERPL-SCNC: 104 MMOL/L — SIGNIFICANT CHANGE UP (ref 98–107)
CO2 SERPL-SCNC: 24 MMOL/L — SIGNIFICANT CHANGE UP (ref 22–31)
CREAT SERPL-MCNC: 0.79 MG/DL — SIGNIFICANT CHANGE UP (ref 0.5–1.3)
EGFR: 96 ML/MIN/1.73M2 — SIGNIFICANT CHANGE UP
EOSINOPHIL # BLD AUTO: 0.09 K/UL — SIGNIFICANT CHANGE UP (ref 0–0.5)
EOSINOPHIL NFR BLD AUTO: 2 % — SIGNIFICANT CHANGE UP (ref 0–6)
GLUCOSE SERPL-MCNC: 94 MG/DL — SIGNIFICANT CHANGE UP (ref 70–99)
HCG SERPL-ACNC: 106.2 MIU/ML — SIGNIFICANT CHANGE UP
HCT VFR BLD CALC: 30.8 % — LOW (ref 34.5–45)
HGB BLD-MCNC: 9.5 G/DL — LOW (ref 11.5–15.5)
IANC: 2.15 K/UL — SIGNIFICANT CHANGE UP (ref 1.5–8.5)
IMM GRANULOCYTES NFR BLD AUTO: 0.2 % — SIGNIFICANT CHANGE UP (ref 0–1.5)
LYMPHOCYTES # BLD AUTO: 1.92 K/UL — SIGNIFICANT CHANGE UP (ref 1–3.3)
LYMPHOCYTES # BLD AUTO: 42.7 % — SIGNIFICANT CHANGE UP (ref 13–44)
MAGNESIUM SERPL-MCNC: 2.1 MG/DL — SIGNIFICANT CHANGE UP (ref 1.6–2.6)
MCHC RBC-ENTMCNC: 25.3 PG — LOW (ref 27–34)
MCHC RBC-ENTMCNC: 30.8 GM/DL — LOW (ref 32–36)
MCV RBC AUTO: 82.1 FL — SIGNIFICANT CHANGE UP (ref 80–100)
MONOCYTES # BLD AUTO: 0.31 K/UL — SIGNIFICANT CHANGE UP (ref 0–0.9)
MONOCYTES NFR BLD AUTO: 6.9 % — SIGNIFICANT CHANGE UP (ref 2–14)
NEUTROPHILS # BLD AUTO: 2.15 K/UL — SIGNIFICANT CHANGE UP (ref 1.8–7.4)
NEUTROPHILS NFR BLD AUTO: 47.8 % — SIGNIFICANT CHANGE UP (ref 43–77)
NRBC # BLD: 0 /100 WBCS — SIGNIFICANT CHANGE UP
NRBC # FLD: 0 K/UL — SIGNIFICANT CHANGE UP
PHOSPHATE SERPL-MCNC: 3.7 MG/DL — SIGNIFICANT CHANGE UP (ref 2.5–4.5)
PLATELET # BLD AUTO: 211 K/UL — SIGNIFICANT CHANGE UP (ref 150–400)
POTASSIUM SERPL-MCNC: 4.2 MMOL/L — SIGNIFICANT CHANGE UP (ref 3.5–5.3)
POTASSIUM SERPL-SCNC: 4.2 MMOL/L — SIGNIFICANT CHANGE UP (ref 3.5–5.3)
RBC # BLD: 3.75 M/UL — LOW (ref 3.8–5.2)
RBC # FLD: 15.6 % — HIGH (ref 10.3–14.5)
SODIUM SERPL-SCNC: 139 MMOL/L — SIGNIFICANT CHANGE UP (ref 135–145)
WBC # BLD: 4.5 K/UL — SIGNIFICANT CHANGE UP (ref 3.8–10.5)
WBC # FLD AUTO: 4.5 K/UL — SIGNIFICANT CHANGE UP (ref 3.8–10.5)

## 2022-03-17 PROCEDURE — 76817 TRANSVAGINAL US OBSTETRIC: CPT | Mod: 26

## 2022-03-17 NOTE — ED ADULT NURSE NOTE - OBJECTIVE STATEMENT
pt received to intake 10 for rpt bHCG levels. PMHx ectopic pregnancy. 20G IV placed right AC, labs drawn and sent.

## 2022-03-17 NOTE — CHART NOTE - NSCHARTNOTEFT_GEN_A_CORE
R3 Chart Note    Reviewed consult from overnight and further discussed the patients downtrending HCG of >80% with concurrent ultrasound findings.  Clinical presentation likely represents early pregnancy failure, and patient may follow up in 1 week for repeat HCG and does not need to return to ED on 3/19, however with strict return precautions.  [facility] GYN clinic emailed for appointment on 3/24.  Attempting to reach patient to review recommendations.    d/w Dr. Robert Rodriguez, PGY3 R3 Chart Note    Reviewed consult from overnight and further discussed the patients downtrending HCG of >80% with concurrent ultrasound findings.  Clinical presentation likely represents early pregnancy failure, and patient may follow up in 1 week for repeat HCG and does not need to return to ED on 3/19, however with strict return precautions.  [facility] GYN clinic emailed for appointment on 3/24.  Discussed plan of care and return precautions with patient.  All questions answered.    d/w Dr. Robert Rodriguez, PGY3

## 2022-03-17 NOTE — CONSULT NOTE ADULT - ASSESSMENT
42yo  LMP 1/10 (EGA 9w3d) presenting to ED for follow up of PUL. Patient previously seen at [facility] with  and heavy bleeding, HCG now downtrending and bleeding resolved. VSS. H/H stable. TVUS today with no IUP, nothing in the adnexa. Given resolution of bleeding and drop in HCG, likely miscarriage however cannot definitively rule out ectopic.     - Patient to follow up in 48hrs in ED for repeat HCG   - Discussed importance of follow up with patient given that ectopic pregnancy remains on the differential   - Ectopic precautions reviewed, pt advised to return to the ED if she experiences intense abdominal pain, vomiting, inability to tolerate PO.   - O+, rhogam not indicated     Fabby Oseguera MD PGY2  d/w Dr. Huntley  42yo  LMP 1/10 (EGA 9w3d) presenting to ED for follow up of PUL. Patient previously seen at [facility] with  and heavy bleeding, HCG now downtrending and bleeding resolved. VSS. H/H stable. TVUS today with no IUP, nothing in the adnexa. Given resolution of bleeding and drop in HCG, likely miscarriage however cannot definitively rule out ectopic.     - Patient to follow up in 48hrs in ED for repeat HCG   - Discussed importance of follow up with patient given that ectopic pregnancy remains on the differential   - Ectopic precautions reviewed, pt advised to return to the ED if she experiences intense abdominal pain, vomiting, inability to tolerate PO.   - O+, rhogam not indicated     Fabby Oseguera MD PGY2  d/w Dr. Huntley       Agree with above resident note.  Bhcg trending down, pt stable with no complaints.  Strict ectopic and bleeding precautions given.  Pt to return to ED in 48 hrs or PRN.      Harleen Huntley MD

## 2022-03-17 NOTE — ED PROVIDER NOTE - CLINICAL SUMMARY MEDICAL DECISION MAKING FREE TEXT BOX
Patient is a 41 y.o female with PMHx ectopic pregnancy is  LMP 1/10/22 who presents to ED for repeat Beta HCG.     DDx- preg of unknown location. GYN aware patient in ED- agrees with labs and sono

## 2022-03-17 NOTE — ED PROVIDER NOTE - PHYSICAL EXAMINATION
Vital signs reviewed.   CONSTITUTIONAL: Well-appearing; well-nourished; in no apparent distress. Non-toxic appearing.   HEAD: Normocephalic, atraumatic.  EYES:  conjunctiva and sclera WNL.  CARD: Normal S1, S2;  RESP: Normal chest excursion with respiration; breath sounds clear and equal bilaterally; no wheezes, rhonchi, or rales.  ABD/GI: soft, non-distended; non-tender;   EXT/MS: moves all extremities;  SKIN: Normal for age and race; warm; dry; good turgor; no apparent lesions or exudate noted.  NEURO: Awake, alert, oriented x 3, no gross deficits  PSYCH: Normal mood; appropriate affect.

## 2022-03-17 NOTE — ED PROVIDER NOTE - PATIENT PORTAL LINK FT
You can access the FollowMyHealth Patient Portal offered by [facility] by registering at the following website: [email or web address]. By joining [facility]’s FollowMyHealth portal, you will also be able to view your health information using other applications (apps) compatible with our system.

## 2022-03-17 NOTE — CONSULT NOTE ADULT - SUBJECTIVE AND OBJECTIVE BOX
GENESIS ISLAS  41y  Female [id number]    HPI:        Name of GYN Physician:     POB:    Pgyn: Denies fibroids, cysts, endometriosis, STI's, Abnormal pap smears   PMHX:  PSHx:  Meds:  All:  Social History:  Denies smoking use, drug use, alcohol use.   +occasional social alcohol use    Vital Signs Last 24 Hrs  T(C): 36.9 (16 Mar 2022 23:17), Max: 36.9 (16 Mar 2022 23:17)  T(F): 98.4 (16 Mar 2022 23:17), Max: 98.4 (16 Mar 2022 23:17)  HR: 88 (16 Mar 2022 23:17) (88 - 88)  BP: 121/70 (16 Mar 2022 23:17) (121/70 - 121/70)  BP(mean): --  RR: 16 (16 Mar 2022 23:17) (16 - 16)  SpO2: 100% (16 Mar 2022 23:17) (100% - 100%)    Physical Exam:   General: sitting comftorably in bed, NAD   HEENT: neck supple, full ROM  CV: RR S1S2 no m/r/g  Lungs: CTA b/l, good air flow b/l   Back: No CVA tenderness  Abd: Soft, non-tender, non-distended.  Bowel sounds present.    :  No bleeding on pad.  External labia wnl.  Bimanual exam with no cervical motion tenderness, uterus wnl, adnexa non palpable  and nontender bilaterally.  Cervix closed vs. Cervix dilated    cm   Speculum Exam: No active bleeding from os.  Physiologic discharge.    Ext: non-tender b/l, no edema     LABS:                     9.5    4.50  )-----------( 211      ( 17 Mar 2022 00:38 )             30.8     03-17    139  |  104  |  17  ----------------------------<  94  4.2   |  24  |  0.79    Ca    9.1      17 Mar 2022 00:38  Phos  3.7     03-17  Mg     2.10     03-17    HCG Quantitative, Serum: 560 (3/12)  HCG Quantitative, Serum: 106.2 (3/17)  ABO RH Interpretation: O POS (03.12.22 @ 01:54)     I&O's Detail        RADIOLOGY & ADDITIONAL STUDIES:  < from: US Transvaginal, OB (03.17.22 @ 00:52) >  ACC: [id number] EXAM:  US OB TRANSVAGINAL                          PROCEDURE DATE:  03/17/2022          INTERPRETATION:  CLINICAL INFORMATION: Pregnancy of unknown location.   Beta-hCG today = 106. Beta hCG on 3/12 =560.    LMP: 1/10/2022    Estimated Gestational Age by LMP: 9 weeks 3 days    COMPARISON: Pelvic ultrasound 3/12/2022    Endovaginal pelvic sonogram only. Color and Spectral Doppler was   performed.    FINDINGS:  Uterus: 7.8 x 4.3 x 5.4 cm. 1.1 x 0.6 x 1.7 cm intramural left-sided   myoma. Endometrium: 0.8 cm. No gestational sac.    Right ovary: 2.2 x 2.4 x 2.3 cm. Previously visualized paraovarian cystic   lesion is not seen on current study. Normal arterial and venous waveforms.    Left ovary: 2.4 x 1.4 x 1.6 cm. Within normal limits. Normal arterial and   venous waveforms.    Fluid: None.    IMPRESSION:  No intrauterine or extra uterine gestational sac identified. In the   setting of decreasing beta hCG, differential diagnosis includes failed   pregnancy and occult ectopic pregnancy. Continued trending of beta hCG   and short-term follow-up imaging is recommended.    --- End of Report ---          BRENDA ORTEZ MD; Resident Radiologist  This document has been electronically signed.  HORACE DUKES MD; Attending Radiologist  This document has been electronically signed. Mar 17 2022  1:47AM    < end of copied text >   GENESIS ISLAS  41y  Female [id number]    HPI:  42yo  LMP 10 (EGA 9w3d) presenting to ED for follow up for PUL. Patient was initially seen at [facility] on 3/12 at which time she had heavy vaginal bleeding and . Patient returns today for repeat blood draw, imaging. She denies vaginal bleeding, pelvic pain. Otherwise no complaints.       Name of GYN Physician: None (lost insurance)    POB:  pLTCS, 2018 ectopic treated with left salpingectomy,  sAB,  .  Pgyn: Denies fibroids, cysts, endometriosis, STI's, Abnormal pap smears   PMHX: Anemia   PSHx: C/S, L salpingectomy   Meds: None   All: NKDA  Social History:  Denies smoking use, drug use, alcohol use.      Vital Signs Last 24 Hrs  T(C): 36.9 (16 Mar 2022 23:17), Max: 36.9 (16 Mar 2022 23:17)  T(F): 98.4 (16 Mar 2022 23:17), Max: 98.4 (16 Mar 2022 23:17)  HR: 88 (16 Mar 2022 23:17) (88 - 88)  BP: 121/70 (16 Mar 2022 23:17) (121/70 - 121/70)  BP(mean): --  RR: 16 (16 Mar 2022 23:17) (16 - 16)  SpO2: 100% (16 Mar 2022 23:17) (100% - 100%)    Physical Exam:   General: sitting comfortably in bed, NAD   CV: RRR  Lungs: nl work of breathing   Abd: Soft, non-tender, non-distended.    : No bleeding   Ext: non-tender b/l, no edema     LABS:                     9.5    4.50  )-----------( 211      ( 17 Mar 2022 00:38 )             30.8     -    139  |  104  |  17  ----------------------------<  94  4.2   |  24  |  0.79    Ca    9.1      17 Mar 2022 00:38  Phos  3.7     03-  Mg     2.10     -    HCG Quantitative, Serum: 560 (3/12)  HCG Quantitative, Serum: 106.2 (3/17)  ABO RH Interpretation: O POS (22 @ 01:54)     I&O's Detail        RADIOLOGY & ADDITIONAL STUDIES:  < from: US Transvaginal, OB (22 @ 00:52) >  ACC: [id number] EXAM:  US OB TRANSVAGINAL                          PROCEDURE DATE:  2022          INTERPRETATION:  CLINICAL INFORMATION: Pregnancy of unknown location.   Beta-hCG today = 106. Beta hCG on 3/12 =560.    LMP: 1/10/2022    Estimated Gestational Age by LMP: 9 weeks 3 days    COMPARISON: Pelvic ultrasound 3/12/2022    Endovaginal pelvic sonogram only. Color and Spectral Doppler was   performed.    FINDINGS:  Uterus: 7.8 x 4.3 x 5.4 cm. 1.1 x 0.6 x 1.7 cm intramural left-sided   myoma. Endometrium: 0.8 cm. No gestational sac.    Right ovary: 2.2 x 2.4 x 2.3 cm. Previously visualized paraovarian cystic   lesion is not seen on current study. Normal arterial and venous waveforms.    Left ovary: 2.4 x 1.4 x 1.6 cm. Within normal limits. Normal arterial and   venous waveforms.    Fluid: None.    IMPRESSION:  No intrauterine or extra uterine gestational sac identified. In the   setting of decreasing beta hCG, differential diagnosis includes failed   pregnancy and occult ectopic pregnancy. Continued trending of beta hCG   and short-term follow-up imaging is recommended.    --- End of Report ---          BRENDA ORTEZ MD; Resident Radiologist  This document has been electronically signed.  HORACE DUKES MD; Attending Radiologist  This document has been electronically signed. Mar 17 2022  1:47AM    < end of copied text >

## 2022-03-17 NOTE — ED PROVIDER NOTE - PROGRESS NOTE DETAILS
PA Nguyen- Beta-Hcg level downtrending from 560 to 106.2. US shows no IUP. GYN consulted and state patient to f/u in 48 Hours for repeat beta. Pt understands. Pt Rh +. No active bleeding. Pt has no complaints. Strict return instructions given.

## 2022-03-17 NOTE — ED PROVIDER NOTE - OBJECTIVE STATEMENT
HPI- Patient is a 41 y.o female with PMHx ectopic pregnancy is  lmp 1/10/22 who presents to ED for repeat Beta HCG HPI- Patient is a 41 y.o female with PMHx ectopic pregnancy is  LMP 1/10/22 who presents to ED for repeat Beta HCG. Pt states last week she was seen at [facility] for vaginal bleeding in setting of +pregnancy test. Pt had labs and US that showed +hcg of 560 but US showed no IUP. Pt was added to OBGYN's beta list and patient was advised to follow up for repeat BW. Now coming today for repeat labs. Pt denies abd pain, vaginal bleeding, discharge, urinary complaints, fevers, chills, n/v/d, dizziness or any other complaints.

## 2022-03-17 NOTE — ED PROVIDER NOTE - NSFOLLOWUPINSTRUCTIONS_ED_ALL_ED_FT
Patient advised to follow up in ER IN 48 HOURS FOR REPEAT BLOOD WORK   and told to return to the emergency department immediately for any new or concerning symptoms such as FEVERS, CHILLS, ABDOMINAL PAIN, NAUSEA, VOMITING, DIZZINESS, VAGINAL BLEEDING OR ANY OTHER COMPLAINTS. Patient agrees with plan.      Rest, stay hydrated   OB/GYN (call [phone] to make an appointment with the OB/GYN clinic)     Continue all previously prescribed medications as directed unless otherwise instructed.  Follow up with your primary care physician in 48-72 hours- bring copies of your results.  Return to the ER for worsening or persistent symptoms, and/or ANY NEW OR CONCERNING SYMPTOMS. If you have issues obtaining follow up, please call: [phone] to obtain a doctor or specialist who takes your insurance in your area.  You may call [phone] to make an appointment with the internal medicine clinic.

## 2022-03-21 NOTE — ED ADULT NURSE NOTE - NSSISCREENINGQ2_ED_A_ED
Clinic Note    Patient Name: Tiffani Couch  : 1969  MRN: [id number]    HPI:    Chief Complaint   Patient presents with    Medication Refill    Follow-up     1 month follow up      Ms. Tiffani Couch is a 52 y.o. female who present to clinic today with CC of follow up on chronic pain including chronic back pain and OA related pain in multiple joints.  Patient reports chronic pain is well controlled on current medication regimen.  Denies problems or side effects with medications.  Patient is up to date on follow up for other chronic disease processes including DM, HTN, HLD, anxiety/depression and asthma.  She is up to date on lab work.  She requests some medication refills today.  Patient is, otherwise, without complaints.    Medications:    Current Outpatient Medications on File Prior to Visit   Medication Sig Dispense Refill    albuterol (PROVENTIL/VENTOLIN HFA) 90 mcg/actuation inhaler Inhale 2 puffs into the lungs every 6 (six) hours as needed for Wheezing. Rescue      blood sugar diagnostic (TRUETEST TEST STRIPS MISC) 1 strip by Misc.(Non-Drug; Combo Route) route.      BLOOD-GLUCOSE METER MISC by Misc.(Non-Drug; Combo Route) route 2 (two) times a day.      cetirizine (ZYRTEC) 10 MG tablet Take 10 mg by mouth once daily.      lancets (ULTRA THIN LANCETS) 31 gauge Misc 1 lancet by Misc.(Non-Drug; Combo Route) route 2 (two) times a day.      omeprazole (PRILOSEC) 20 MG capsule Take 20 mg by mouth 2 (two) times daily as needed.      potassium chloride (MICRO-K) 10 MEQ CpSR Take 10 mEq by mouth 2 (two) times daily.      tiZANidine (ZANAFLEX) 4 MG tablet Take 1 tablet (4 mg total) by mouth every 8 (eight) hours as needed (muscle spasm). 60 tablet 3    traZODone (DESYREL) 50 MG tablet Take 2 tablets (100 mg total) by mouth nightly as needed for Insomnia. 2 tablets at night time prn 180 tablet 1    triamterene-hydrochlorothiazide 37.5-25 mg (DYAZIDE) 37.5-25 mg per capsule Take 1 capsule by mouth every  morning.      [DISCONTINUED] amLODIPine (NORVASC) 10 MG tablet Take 1 tablet (10 mg total) by mouth once daily. 90 tablet 1    [DISCONTINUED] atorvastatin (LIPITOR) 20 MG tablet Take 1 tablet (20 mg total) by mouth every evening. 90 tablet 1    [DISCONTINUED] HYDROcodone-acetaminophen (NORCO)  mg per tablet Take 1 tablet by mouth every 8 (eight) hours as needed for Pain. 90 tablet 0    [DISCONTINUED] losartan (COZAAR) 100 MG tablet Take 100 mg by mouth once daily.      [DISCONTINUED] metFORMIN (GLUCOPHAGE) 500 MG tablet Take 1 tablet (500 mg total) by mouth daily with breakfast. 90 tablet 1    albuterol (PROVENTIL/VENTOLIN HFA) 90 mcg/actuation inhaler Inhale 1-2 puffs into the lungs every 6 (six) hours as needed for Wheezing. Rescue (Patient not taking: No sig reported) 8 g 0    blood sugar diagnostic (BLOOD GLUCOSE TEST) Strp 1 strip by Misc.(Non-Drug; Combo Route) route 2 (two) times a day.       No current facility-administered medications on file prior to visit.         Allergies: Bactrim [sulfamethoxazole-trimethoprim] and Penicillins      Past Medical History:    Past Medical History:   Diagnosis Date    Anxiety     Arthritis     Asthma     Depression     Diabetes mellitus, type 2     Hyperlipidemia     Hypertension        Past Surgical History:    Past Surgical History:   Procedure Laterality Date    BRAIN SURGERY      CHOLECYSTECTOMY           Social History:    Social History     Tobacco Use   Smoking Status Current Every Day Smoker    Types: Cigarettes   Smokeless Tobacco Never Used     Social History     Substance and Sexual Activity   Alcohol Use Never     Social History     Substance and Sexual Activity   Drug Use Not Currently         Family History:    Family History   Problem Relation Age of Onset    Diabetes Mother     Hypertension Mother        Review of Systems:    Review of Systems   Constitutional: Negative for appetite change, chills, fatigue, fever and unexpected  "weight change.   Eyes: Negative for visual disturbance.   Respiratory: Negative for cough and shortness of breath.    Cardiovascular: Negative for chest pain and leg swelling.   Gastrointestinal: Negative for abdominal pain, change in bowel habit, constipation, diarrhea, nausea, vomiting and change in bowel habit.   Musculoskeletal: Positive for arthralgias and back pain.   Integumentary:  Negative for rash.   Neurological: Negative for dizziness and headaches.   Psychiatric/Behavioral: The patient is not nervous/anxious.         Vitals:    /86 (BP Location: Left arm, Patient Position: Sitting, BP Method: Large (Manual))   Pulse 81   Temp 97.6 °F (36.4 °C) (Temporal)   Resp 20   Ht 5' 4" (1.626 m)   Wt 110.5 kg (243 lb 9.6 oz)   SpO2 99%   BMI 41.81 kg/m²        Physical Exam:    Physical Exam  Constitutional:       General: She is not in acute distress.     Appearance: Normal appearance. She is obese.   HENT:      Nose: Nose normal.      Mouth/Throat:      Mouth: Mucous membranes are moist.      Pharynx: Oropharynx is clear.   Eyes:      Conjunctiva/sclera: Conjunctivae normal.   Cardiovascular:      Rate and Rhythm: Normal rate and regular rhythm.      Heart sounds: Normal heart sounds. No murmur heard.  Pulmonary:      Effort: Pulmonary effort is normal. No respiratory distress.      Breath sounds: Normal breath sounds. No wheezing, rhonchi or rales.   Abdominal:      General: Bowel sounds are normal.      Palpations: Abdomen is soft.      Tenderness: There is no abdominal tenderness.   Musculoskeletal:      Cervical back: Neck supple.   Skin:     Findings: No rash.   Neurological:      General: No focal deficit present.      Mental Status: She is alert. Mental status is at baseline.   Psychiatric:         Mood and Affect: Mood normal.         Assessment/Plan:   Other chronic pain  -     HYDROcodone-acetaminophen (NORCO)  mg per tablet; Take 1 tablet by mouth every 8 (eight) hours as needed for " Pain.  Dispense: 90 tablet; Refill: 0  -  and UDS reviewed and appropriate.  - Chronic pain is well controlled on current medication regimen. Denies problems or side effects.     Long-term use of high-risk medication  -     POCT Urine Drug Screen Presump    Essential hypertension  -     amLODIPine (NORVASC) 10 MG tablet; Take 1 tablet (10 mg total) by mouth once daily.  Dispense: 90 tablet; Refill: 1  -     losartan (COZAAR) 100 MG tablet; Take 1 tablet (100 mg total) by mouth once daily.  Dispense: 90 tablet; Refill: 1    Other hyperlipidemia  -     atorvastatin (LIPITOR) 20 MG tablet; Take 1 tablet (20 mg total) by mouth every evening.  Dispense: 90 tablet; Refill: 1    Type 2 diabetes mellitus without complication, without long-term current use of insulin  -     metFORMIN (GLUCOPHAGE) 500 MG tablet; Take 1 tablet (500 mg total) by mouth daily with breakfast.  Dispense: 90 tablet; Refill: 1    RTC in 1 month for follow up on chronic pain.  RTC sooner if needed.   Patient voiced understanding and is agreeable to plan.      Aruna Mora MD    Family Medicine       No

## 2022-03-23 ENCOUNTER — OUTPATIENT (OUTPATIENT)
Dept: OUTPATIENT SERVICES | Facility: HOSPITAL | Age: 42
LOS: 1 days | End: 2022-03-23

## 2022-03-23 ENCOUNTER — RESULT REVIEW (OUTPATIENT)
Age: 42
End: 2022-03-23

## 2022-03-23 ENCOUNTER — APPOINTMENT (OUTPATIENT)
Dept: OBGYN | Facility: HOSPITAL | Age: 42
End: 2022-03-23
Payer: MEDICAID

## 2022-03-23 VITALS
BODY MASS INDEX: 38.57 KG/M2 | WEIGHT: 240 LBS | DIASTOLIC BLOOD PRESSURE: 67 MMHG | HEIGHT: 66 IN | SYSTOLIC BLOOD PRESSURE: 115 MMHG | HEART RATE: 68 BPM | TEMPERATURE: 97.5 F

## 2022-03-23 DIAGNOSIS — Z00.00 ENCOUNTER FOR GENERAL ADULT MEDICAL EXAMINATION W/OUT ABNORMAL FINDINGS: ICD-10-CM

## 2022-03-23 DIAGNOSIS — O36.80X0 PREGNANCY WITH INCONCLUSIVE FETAL VIABILITY, NOT APPLICABLE OR UNSPECIFIED: ICD-10-CM

## 2022-03-23 DIAGNOSIS — Z98.891 HISTORY OF UTERINE SCAR FROM PREVIOUS SURGERY: Chronic | ICD-10-CM

## 2022-03-23 LAB — HCG SERPL-ACNC: 20.5 MIU/ML — SIGNIFICANT CHANGE UP

## 2022-03-23 PROCEDURE — 99213 OFFICE O/P EST LOW 20 MIN: CPT | Mod: GE

## 2022-03-24 ENCOUNTER — APPOINTMENT (OUTPATIENT)
Dept: OBGYN | Facility: HOSPITAL | Age: 42
End: 2022-03-24

## 2022-03-24 DIAGNOSIS — O36.80X0 PREGNANCY WITH INCONCLUSIVE FETAL VIABILITY, NOT APPLICABLE OR UNSPECIFIED: ICD-10-CM

## 2022-03-25 ENCOUNTER — APPOINTMENT (OUTPATIENT)
Dept: OBGYN | Facility: HOSPITAL | Age: 42
End: 2022-03-25

## 2022-04-05 ENCOUNTER — NON-APPOINTMENT (OUTPATIENT)
Age: 42
End: 2022-04-05

## 2022-04-11 ENCOUNTER — APPOINTMENT (OUTPATIENT)
Dept: OBGYN | Facility: HOSPITAL | Age: 42
End: 2022-04-11

## 2022-04-11 ENCOUNTER — NON-APPOINTMENT (OUTPATIENT)
Age: 42
End: 2022-04-11

## 2022-04-14 ENCOUNTER — NON-APPOINTMENT (OUTPATIENT)
Age: 42
End: 2022-04-14

## 2022-04-19 ENCOUNTER — APPOINTMENT (OUTPATIENT)
Dept: OBGYN | Facility: HOSPITAL | Age: 42
End: 2022-04-19

## 2022-04-19 ENCOUNTER — NON-APPOINTMENT (OUTPATIENT)
Age: 42
End: 2022-04-19

## 2022-04-21 ENCOUNTER — NON-APPOINTMENT (OUTPATIENT)
Age: 42
End: 2022-04-21

## 2022-06-29 ENCOUNTER — TRANSCRIPTION ENCOUNTER (OUTPATIENT)
Age: 42
End: 2022-06-29

## 2022-06-29 ENCOUNTER — APPOINTMENT (OUTPATIENT)
Dept: OBGYN | Facility: CLINIC | Age: 42
End: 2022-06-29
Payer: MEDICAID

## 2022-06-29 VITALS
HEART RATE: 64 BPM | HEIGHT: 66 IN | DIASTOLIC BLOOD PRESSURE: 72 MMHG | WEIGHT: 229 LBS | SYSTOLIC BLOOD PRESSURE: 109 MMHG | BODY MASS INDEX: 36.8 KG/M2

## 2022-06-29 DIAGNOSIS — Z78.9 OTHER SPECIFIED HEALTH STATUS: ICD-10-CM

## 2022-06-29 DIAGNOSIS — F17.200 NICOTINE DEPENDENCE, UNSPECIFIED, UNCOMPLICATED: ICD-10-CM

## 2022-06-29 DIAGNOSIS — Z87.09 PERSONAL HISTORY OF OTHER DISEASES OF THE RESPIRATORY SYSTEM: ICD-10-CM

## 2022-06-29 DIAGNOSIS — O03.9 COMPLETE OR UNSPECIFIED SPONTANEOUS ABORTION W/OUT COMPLICATION: ICD-10-CM

## 2022-06-29 DIAGNOSIS — Z12.39 ENCOUNTER FOR OTHER SCREENING FOR MALIGNANT NEOPLASM OF BREAST: ICD-10-CM

## 2022-06-29 PROCEDURE — 99203 OFFICE O/P NEW LOW 30 MIN: CPT

## 2022-06-29 NOTE — HISTORY OF PRESENT ILLNESS
[Patient reported mammogram was normal] : Patient reported mammogram was normal [Patient reported colonoscopy was normal] : Patient reported colonoscopy was normal [Menarche Age: ____] : age at menarche was [unfilled] [Yes] : Patient has concerns regarding sex [Currently Active] : currently active [Y] : Positive pregnancy history [Approximately ___ (Month)] : LMP was approximately [unfilled] month(s) ago [Regular Cycle Intervals] : periods have been regular [Frequency: Q ___ days] : menstrual periods occur approximately every [unfilled] days [Mammogramdate] : 2017 [ColonoscopyDate] : 2017 [PGHxTotal] : 5 [[facility] [[facility] 2 [PGHxABSpont] : 2 [PGxEctopic] : 1 [FreeTextEntry1] : 6/16/22

## 2022-06-30 LAB — HCG SERPL-MCNC: <1 MIU/ML

## 2022-07-01 ENCOUNTER — APPOINTMENT (OUTPATIENT)
Dept: OBGYN | Facility: CLINIC | Age: 42
End: 2022-07-01

## 2024-04-23 NOTE — ASU DISCHARGE PLAN (ADULT/PEDIATRIC) - ASU DC SPECIAL INSTRUCTIONSFT
"Physical Therapy Treatment    Patient Name: Ericka Womack  MRN: [id number]  Today's Date: 4/23/2024  Time Calculation  Start Time: 0731  Stop Time: 0817  Time Calculation (min): 46 min    Insurance  COPAY 0 (0)  Visit number: 4 of 60  Authorization info: NO AUTH REQ   Insurance Type: [facility] CHOICE PLUS GEHA   POC: 2x wk for 4 weeks     Current Problem  1. Effusion of right knee        General  Referred By: JOSE E Albrecht    Subjective    General   Pt reports she is feeling a little better overall.  However, states \"not today\" upon arrival.  States R LE feels \"really heavy\" & tight today.    Precautions  Precautions  Precautions Comment: No recent falls reported    Pain  Pain Assessment  Pain Assessment: 0-10  Pain Score: 6  Pain Type: Chronic pain  Pain Location: Knee  Pain Orientation: Right  Pain Radiating Towards: Occasional radiating sx's (pain, numbness, tingling) down into shin or up into thigh  Pain Descriptors: Tightness, Aching, Stabbing  Pain Frequency: Intermittent  Clinical Progression: Gradually improving    Objective   Occasional numbness, tingling.  No c/o sx's currently.    AROM R knee flexion: 118 deg  AROM R knee extension: 0 deg    AAROM R knee flexion: 135 deg    Treatments:  Therapeutic Exercises (05680): 43 Minutes, 3 Units    Activities:  NICANOR: 5 minutes  Supine QS: 5\" x20 (into small ball)  Supine heel slides: 15x 5\"  Supine hamstring stretch: 30\"x2  Supine hip flexor stretch: 30\"x2  Calf Stretch w/strap: 30\" x2  Supine hip flexion: x10  S/l hip ab: x10  S/l clamshells: x15  Bridges: x15  LAQ: x10  HR/TR  TKE w/ ball 3\"x10 (not this visit)  Stdg Hip PRE's: --> Add NV     Roller bar/STM to quad, hamstring, gastroc 10' (not this visit-HEP)    Assessment:   Initiated NICANOR at beginning of session to reduce tightness, improve mobility, strength & overall endurance.  She reports mild discomfort on NICANOR, but tolerable & able to complete.  Decreased tightness following NICANOR.  She demonstrates improved " ROM/mobility with heel slide, achieving full ROM this visit.  She struggles at first to fully extend R knee, but when provided sm ball for feedback during QS, she is able to fully extend following activity.  Also following QS, she is better able to perform SLR this visit, with less lag/pain.  Some improvement also noted in ability to complete SLR-abduction this visit.  LAQ added to the activities performed for further strengthening with good tolerance.  Overall, she still has some difficulty with activities secondary to some pain & general weakness, but definitely tolerated better this visit.  Anticipate she will be able to continue with POC & progress activities as tolerated.    Plan:   Continue with POC & progress with activities as tolerated to increase LE strength, stability (focusing on quad strength per MD) and improve functional mobility.       Call your doctor for any SIGNS OR SYMPTOMS OF INFECTION: Fever, chills, temperature  100.4 or higher or have a foul smelling discharge. Follow up with private OB/GYN in 2 weeks for post operative check.

## 2024-09-10 ENCOUNTER — EMERGENCY (EMERGENCY)
Facility: HOSPITAL | Age: 44
LOS: 0 days | Discharge: HOME | End: 2024-09-11
Attending: EMERGENCY MEDICINE
Payer: COMMERCIAL

## 2024-09-10 VITALS
WEIGHT: 210.1 LBS | DIASTOLIC BLOOD PRESSURE: 80 MMHG | SYSTOLIC BLOOD PRESSURE: 115 MMHG | OXYGEN SATURATION: 99 % | HEART RATE: 71 BPM | HEIGHT: 66 IN | RESPIRATION RATE: 18 BRPM | TEMPERATURE: 98 F

## 2024-09-10 DIAGNOSIS — Z98.891 HISTORY OF UTERINE SCAR FROM PREVIOUS SURGERY: Chronic | ICD-10-CM

## 2024-09-10 PROCEDURE — 93010 ELECTROCARDIOGRAM REPORT: CPT

## 2024-09-10 PROCEDURE — 99285 EMERGENCY DEPT VISIT HI MDM: CPT

## 2024-09-10 RX ORDER — AMOXICILLIN 500 MG
1 CAPSULE ORAL
Refills: 0 | DISCHARGE

## 2024-09-10 NOTE — ED PROVIDER NOTE - NSFOLLOWUPINSTRUCTIONS_ED_ALL_ED_FT
1) Take motrin for pain  2) Take prescription medication as instructed  3) Follow up with your primary care doctor  4) Return to the ER for worsening or concerning symptoms

## 2024-09-10 NOTE — ED PROVIDER NOTE - CARE PLAN
Principal Discharge DX:	Right-sided chest pain  Secondary Diagnosis:	UTI (urinary tract infection)   1

## 2024-09-10 NOTE — ED PROVIDER NOTE - PATIENT PORTAL LINK FT
You can access the FollowMyHealth Patient Portal offered by [facility] by registering at the following website: [email or web address]. By joining [employer]’s FollowMyHealth portal, you will also be able to view your health information using other applications (apps) compatible with our system.

## 2024-09-10 NOTE — ED ADULT NURSE NOTE - NSICDXPASTMEDICALHX_GEN_ALL_CORE_FT
PAST MEDICAL HISTORY:  Asthma     Asthma     Ectopic pregnancy     Miscarriage of left tubal ectopic pregnancy

## 2024-09-11 VITALS
HEART RATE: 81 BPM | SYSTOLIC BLOOD PRESSURE: 115 MMHG | TEMPERATURE: 98 F | DIASTOLIC BLOOD PRESSURE: 68 MMHG | OXYGEN SATURATION: 100 % | RESPIRATION RATE: 17 BRPM

## 2024-09-11 LAB
ALBUMIN SERPL ELPH-MCNC: 3.7 G/DL — SIGNIFICANT CHANGE UP (ref 3.3–5)
ALP SERPL-CCNC: 67 U/L — SIGNIFICANT CHANGE UP (ref 40–120)
ALT FLD-CCNC: 20 U/L — SIGNIFICANT CHANGE UP (ref 12–78)
ANION GAP SERPL CALC-SCNC: 4 MMOL/L — LOW (ref 5–17)
APPEARANCE UR: ABNORMAL
AST SERPL-CCNC: 10 U/L — LOW (ref 15–37)
BACTERIA # UR AUTO: ABNORMAL /HPF
BASOPHILS # BLD AUTO: 0.02 K/UL — SIGNIFICANT CHANGE UP (ref 0–0.2)
BASOPHILS NFR BLD AUTO: 0.4 % — SIGNIFICANT CHANGE UP (ref 0–2)
BILIRUB SERPL-MCNC: 0.2 MG/DL — SIGNIFICANT CHANGE UP (ref 0.2–1.2)
BILIRUB UR-MCNC: ABNORMAL
BUN SERPL-MCNC: 17 MG/DL — SIGNIFICANT CHANGE UP (ref 7–23)
CALCIUM SERPL-MCNC: 8.8 MG/DL — SIGNIFICANT CHANGE UP (ref 8.5–10.1)
CHLORIDE SERPL-SCNC: 108 MMOL/L — SIGNIFICANT CHANGE UP (ref 96–108)
CO2 SERPL-SCNC: 26 MMOL/L — SIGNIFICANT CHANGE UP (ref 22–31)
COLOR SPEC: ABNORMAL
CREAT SERPL-MCNC: 0.83 MG/DL — SIGNIFICANT CHANGE UP (ref 0.5–1.3)
D DIMER BLD IA.RAPID-MCNC: 450 NG/ML DDU — HIGH
DIFF PNL FLD: ABNORMAL
EGFR: 89 ML/MIN/1.73M2 — SIGNIFICANT CHANGE UP
EOSINOPHIL # BLD AUTO: 0.47 K/UL — SIGNIFICANT CHANGE UP (ref 0–0.5)
EOSINOPHIL NFR BLD AUTO: 8.5 % — HIGH (ref 0–6)
EPI CELLS # UR: PRESENT
GLUCOSE SERPL-MCNC: 98 MG/DL — SIGNIFICANT CHANGE UP (ref 70–99)
GLUCOSE UR QL: NEGATIVE MG/DL — SIGNIFICANT CHANGE UP
HCG SERPL-ACNC: <1 MIU/ML — SIGNIFICANT CHANGE UP
HCG UR QL: NEGATIVE — SIGNIFICANT CHANGE UP
HCT VFR BLD CALC: 28.6 % — LOW (ref 34.5–45)
HGB BLD-MCNC: 9.2 G/DL — LOW (ref 11.5–15.5)
IMM GRANULOCYTES NFR BLD AUTO: 0.2 % — SIGNIFICANT CHANGE UP (ref 0–0.9)
KETONES UR-MCNC: NEGATIVE MG/DL — SIGNIFICANT CHANGE UP
LEUKOCYTE ESTERASE UR-ACNC: ABNORMAL
LIDOCAIN IGE QN: 38 U/L — SIGNIFICANT CHANGE UP (ref 13–75)
LYMPHOCYTES # BLD AUTO: 2.19 K/UL — SIGNIFICANT CHANGE UP (ref 1–3.3)
LYMPHOCYTES # BLD AUTO: 39.4 % — SIGNIFICANT CHANGE UP (ref 13–44)
MAGNESIUM SERPL-MCNC: 2.2 MG/DL — SIGNIFICANT CHANGE UP (ref 1.6–2.6)
MCHC RBC-ENTMCNC: 25.3 PG — LOW (ref 27–34)
MCHC RBC-ENTMCNC: 32.2 G/DL — SIGNIFICANT CHANGE UP (ref 32–36)
MCV RBC AUTO: 78.8 FL — LOW (ref 80–100)
MONOCYTES # BLD AUTO: 0.29 K/UL — SIGNIFICANT CHANGE UP (ref 0–0.9)
MONOCYTES NFR BLD AUTO: 5.2 % — SIGNIFICANT CHANGE UP (ref 2–14)
NEUTROPHILS # BLD AUTO: 2.58 K/UL — SIGNIFICANT CHANGE UP (ref 1.8–7.4)
NEUTROPHILS NFR BLD AUTO: 46.3 % — SIGNIFICANT CHANGE UP (ref 43–77)
NITRITE UR-MCNC: POSITIVE
NRBC # BLD: 0 /100 WBCS — SIGNIFICANT CHANGE UP (ref 0–0)
PH UR: 5.5 — SIGNIFICANT CHANGE UP (ref 5–8)
PLATELET # BLD AUTO: 226 K/UL — SIGNIFICANT CHANGE UP (ref 150–400)
POTASSIUM SERPL-MCNC: 4 MMOL/L — SIGNIFICANT CHANGE UP (ref 3.5–5.3)
POTASSIUM SERPL-SCNC: 4 MMOL/L — SIGNIFICANT CHANGE UP (ref 3.5–5.3)
PROT SERPL-MCNC: 7.4 GM/DL — SIGNIFICANT CHANGE UP (ref 6–8.3)
PROT UR-MCNC: 100 MG/DL
RBC # BLD: 3.63 M/UL — LOW (ref 3.8–5.2)
RBC # FLD: 16.9 % — HIGH (ref 10.3–14.5)
RBC CASTS # UR COMP ASSIST: ABNORMAL /HPF
SODIUM SERPL-SCNC: 138 MMOL/L — SIGNIFICANT CHANGE UP (ref 135–145)
SP GR SPEC: >1.03 — HIGH (ref 1–1.03)
TROPONIN I, HIGH SENSITIVITY RESULT: <3 NG/L — SIGNIFICANT CHANGE UP
UROBILINOGEN FLD QL: 1 MG/DL — SIGNIFICANT CHANGE UP (ref 0.2–1)
WBC # BLD: 5.56 K/UL — SIGNIFICANT CHANGE UP (ref 3.8–10.5)
WBC # FLD AUTO: 5.56 K/UL — SIGNIFICANT CHANGE UP (ref 3.8–10.5)
WBC UR QL: ABNORMAL /HPF (ref 0–5)

## 2024-09-11 PROCEDURE — 71045 X-RAY EXAM CHEST 1 VIEW: CPT | Mod: 26

## 2024-09-11 PROCEDURE — 71275 CT ANGIOGRAPHY CHEST: CPT | Mod: 26,MC

## 2024-09-11 RX ORDER — KETOROLAC TROMETHAMINE 30 MG/ML
15 INJECTION, SOLUTION INTRAMUSCULAR ONCE
Refills: 0 | Status: DISCONTINUED | OUTPATIENT
Start: 2024-09-11 | End: 2024-09-11

## 2024-09-11 RX ORDER — CEPHALEXIN 500 MG
1 CAPSULE ORAL
Qty: 14 | Refills: 0
Start: 2024-09-11 | End: 2024-09-17

## 2024-09-11 RX ADMIN — Medication 100 MILLIGRAM(S): at 02:44

## 2024-09-11 RX ADMIN — KETOROLAC TROMETHAMINE 15 MILLIGRAM(S): 30 INJECTION, SOLUTION INTRAMUSCULAR at 00:50

## 2024-09-11 RX ADMIN — KETOROLAC TROMETHAMINE 15 MILLIGRAM(S): 30 INJECTION, SOLUTION INTRAMUSCULAR at 01:50

## 2024-09-11 RX ADMIN — Medication 1000 MILLIGRAM(S): at 03:15

## 2024-12-02 NOTE — OB PROVIDER DELIVERY SUMMARY - NSVAGINALEXAMCERT_OBGYN_ALL_OB
The Delivery OB Provider certifies that vaginal examination and/or abdominal examination after the delivery was done and no foreign body was found.
4-10 METS

## 2025-03-24 NOTE — PATIENT PROFILE ADULT - HOW PATIENT ADDRESSED, PROFILE
Pt has read [employer] message with results & recommendations. To call the office with any questions. 
Sarah